# Patient Record
Sex: FEMALE | Race: OTHER | Employment: UNEMPLOYED | URBAN - METROPOLITAN AREA
[De-identification: names, ages, dates, MRNs, and addresses within clinical notes are randomized per-mention and may not be internally consistent; named-entity substitution may affect disease eponyms.]

---

## 2020-02-05 ENCOUNTER — OFFICE VISIT (OUTPATIENT)
Dept: FAMILY MEDICINE CLINIC | Facility: CLINIC | Age: 40
End: 2020-02-05
Payer: MEDICARE

## 2020-02-05 ENCOUNTER — LAB ENCOUNTER (OUTPATIENT)
Dept: LAB | Age: 40
End: 2020-02-05
Attending: FAMILY MEDICINE
Payer: MEDICARE

## 2020-02-05 VITALS
HEART RATE: 94 BPM | RESPIRATION RATE: 16 BRPM | WEIGHT: 165 LBS | SYSTOLIC BLOOD PRESSURE: 120 MMHG | OXYGEN SATURATION: 95 % | TEMPERATURE: 98 F | HEIGHT: 62 IN | DIASTOLIC BLOOD PRESSURE: 80 MMHG | BODY MASS INDEX: 30.36 KG/M2

## 2020-02-05 DIAGNOSIS — Q93.88 SMITH-MAGENIS SYNDROME: Primary | ICD-10-CM

## 2020-02-05 DIAGNOSIS — Z79.4 TYPE 2 DIABETES MELLITUS WITH DIABETIC NEUROPATHY, WITH LONG-TERM CURRENT USE OF INSULIN (HCC): ICD-10-CM

## 2020-02-05 DIAGNOSIS — M79.604 BILATERAL LEG PAIN: ICD-10-CM

## 2020-02-05 DIAGNOSIS — E11.40 TYPE 2 DIABETES MELLITUS WITH DIABETIC NEUROPATHY, WITH LONG-TERM CURRENT USE OF INSULIN (HCC): ICD-10-CM

## 2020-02-05 DIAGNOSIS — E78.5 HYPERLIPIDEMIA, UNSPECIFIED HYPERLIPIDEMIA TYPE: ICD-10-CM

## 2020-02-05 DIAGNOSIS — L40.9 PSORIASIS: ICD-10-CM

## 2020-02-05 DIAGNOSIS — H92.02 LEFT EAR PAIN: ICD-10-CM

## 2020-02-05 DIAGNOSIS — Z12.31 ENCOUNTER FOR SCREENING MAMMOGRAM FOR BREAST CANCER: ICD-10-CM

## 2020-02-05 DIAGNOSIS — M79.605 BILATERAL LEG PAIN: ICD-10-CM

## 2020-02-05 PROBLEM — I10 ESSENTIAL HYPERTENSION: Status: ACTIVE | Noted: 2020-02-05

## 2020-02-05 PROBLEM — Q93.59: Status: ACTIVE | Noted: 2020-02-05

## 2020-02-05 LAB
ALBUMIN SERPL-MCNC: 3.3 G/DL (ref 3.4–5)
ALBUMIN/GLOB SERPL: 0.8 {RATIO} (ref 1–2)
ALP LIVER SERPL-CCNC: 68 U/L (ref 37–98)
ALT SERPL-CCNC: 21 U/L (ref 13–56)
ANION GAP SERPL CALC-SCNC: 8 MMOL/L (ref 0–18)
AST SERPL-CCNC: 11 U/L (ref 15–37)
BASOPHILS # BLD AUTO: 0.04 X10(3) UL (ref 0–0.2)
BASOPHILS NFR BLD AUTO: 0.4 %
BILIRUB SERPL-MCNC: 0.2 MG/DL (ref 0.1–2)
BUN BLD-MCNC: 15 MG/DL (ref 7–18)
BUN/CREAT SERPL: 14.2 (ref 10–20)
CALCIUM BLD-MCNC: 8.7 MG/DL (ref 8.5–10.1)
CHLORIDE SERPL-SCNC: 104 MMOL/L (ref 98–112)
CHOLEST SMN-MCNC: 142 MG/DL (ref ?–200)
CO2 SERPL-SCNC: 22 MMOL/L (ref 21–32)
CREAT BLD-MCNC: 1.06 MG/DL (ref 0.55–1.02)
DEPRECATED RDW RBC AUTO: 43.2 FL (ref 35.1–46.3)
EOSINOPHIL # BLD AUTO: 0.45 X10(3) UL (ref 0–0.7)
EOSINOPHIL NFR BLD AUTO: 4.6 %
ERYTHROCYTE [DISTWIDTH] IN BLOOD BY AUTOMATED COUNT: 13.3 % (ref 11–15)
EST. AVERAGE GLUCOSE BLD GHB EST-MCNC: 171 MG/DL (ref 68–126)
GLOBULIN PLAS-MCNC: 4 G/DL (ref 2.8–4.4)
GLUCOSE BLD-MCNC: 144 MG/DL (ref 70–99)
HBA1C MFR BLD HPLC: 7.6 % (ref ?–5.7)
HCT VFR BLD AUTO: 34.2 % (ref 35–48)
HDLC SERPL-MCNC: 44 MG/DL (ref 40–59)
HGB BLD-MCNC: 11.1 G/DL (ref 12–16)
IMM GRANULOCYTES # BLD AUTO: 0.04 X10(3) UL (ref 0–1)
IMM GRANULOCYTES NFR BLD: 0.4 %
LDLC SERPL CALC-MCNC: 37 MG/DL (ref ?–100)
LYMPHOCYTES # BLD AUTO: 2.79 X10(3) UL (ref 1–4)
LYMPHOCYTES NFR BLD AUTO: 28.5 %
M PROTEIN MFR SERPL ELPH: 7.3 G/DL (ref 6.4–8.2)
MCH RBC QN AUTO: 28.7 PG (ref 26–34)
MCHC RBC AUTO-ENTMCNC: 32.5 G/DL (ref 31–37)
MCV RBC AUTO: 88.4 FL (ref 80–100)
MONOCYTES # BLD AUTO: 0.64 X10(3) UL (ref 0.1–1)
MONOCYTES NFR BLD AUTO: 6.5 %
NEUTROPHILS # BLD AUTO: 5.82 X10 (3) UL (ref 1.5–7.7)
NEUTROPHILS # BLD AUTO: 5.82 X10(3) UL (ref 1.5–7.7)
NEUTROPHILS NFR BLD AUTO: 59.6 %
NONHDLC SERPL-MCNC: 98 MG/DL (ref ?–130)
OSMOLALITY SERPL CALC.SUM OF ELEC: 281 MOSM/KG (ref 275–295)
PATIENT FASTING Y/N/NP: YES
PATIENT FASTING Y/N/NP: YES
PLATELET # BLD AUTO: 288 10(3)UL (ref 150–450)
POTASSIUM SERPL-SCNC: 4.2 MMOL/L (ref 3.5–5.1)
RBC # BLD AUTO: 3.87 X10(6)UL (ref 3.8–5.3)
SODIUM SERPL-SCNC: 134 MMOL/L (ref 136–145)
TRIGL SERPL-MCNC: 304 MG/DL (ref 30–149)
TSI SER-ACNC: 1.38 MIU/ML (ref 0.36–3.74)
VLDLC SERPL CALC-MCNC: 61 MG/DL (ref 0–30)
WBC # BLD AUTO: 9.8 X10(3) UL (ref 4–11)

## 2020-02-05 PROCEDURE — 80053 COMPREHEN METABOLIC PANEL: CPT

## 2020-02-05 PROCEDURE — 36415 COLL VENOUS BLD VENIPUNCTURE: CPT

## 2020-02-05 PROCEDURE — 83036 HEMOGLOBIN GLYCOSYLATED A1C: CPT

## 2020-02-05 PROCEDURE — 84443 ASSAY THYROID STIM HORMONE: CPT

## 2020-02-05 PROCEDURE — 99203 OFFICE O/P NEW LOW 30 MIN: CPT | Performed by: FAMILY MEDICINE

## 2020-02-05 PROCEDURE — 85025 COMPLETE CBC W/AUTO DIFF WBC: CPT

## 2020-02-05 PROCEDURE — 80061 LIPID PANEL: CPT

## 2020-02-05 RX ORDER — NORETHINDRONE AND ETHINYL ESTRADIOL 1 MG-35MCG
KIT ORAL
COMMUNITY
Start: 2020-01-21 | End: 2020-06-09

## 2020-02-05 RX ORDER — GABAPENTIN 300 MG/1
CAPSULE ORAL
COMMUNITY
Start: 2020-01-23 | End: 2020-03-05

## 2020-02-05 RX ORDER — FLASH GLUCOSE SENSOR
KIT MISCELLANEOUS
COMMUNITY

## 2020-02-05 RX ORDER — FLASH GLUCOSE SCANNING READER
EACH MISCELLANEOUS
COMMUNITY

## 2020-02-05 RX ORDER — TRAMADOL HYDROCHLORIDE 50 MG/1
50 TABLET ORAL 2 TIMES DAILY
Qty: 60 TABLET | Refills: 5 | Status: SHIPPED | OUTPATIENT
Start: 2020-02-05 | End: 2020-03-09

## 2020-02-05 RX ORDER — INSULIN GLARGINE 100 [IU]/ML
INJECTION, SOLUTION SUBCUTANEOUS
COMMUNITY
Start: 2019-12-22 | End: 2021-02-18

## 2020-02-05 RX ORDER — LOTEPREDNOL ETABONATE AND TOBRAMYCIN 5; 3 MG/ML; MG/ML
SUSPENSION/ DROPS OPHTHALMIC
COMMUNITY
Start: 2019-12-22 | End: 2020-10-27

## 2020-02-05 RX ORDER — LISINOPRIL 5 MG/1
5 TABLET ORAL 2 TIMES DAILY
COMMUNITY
End: 2020-03-03

## 2020-02-05 RX ORDER — SECUKINUMAB 150 MG/ML
INJECTION SUBCUTANEOUS
COMMUNITY
Start: 2020-01-31 | End: 2020-03-09

## 2020-02-05 RX ORDER — SIMVASTATIN 20 MG
TABLET ORAL
COMMUNITY
Start: 2020-01-28 | End: 2020-06-29

## 2020-02-05 RX ORDER — IBUPROFEN 800 MG/1
TABLET ORAL
COMMUNITY
Start: 2020-01-22 | End: 2020-03-16

## 2020-02-05 NOTE — H&P
[address]    History and Physical    Leiter Dayna Patient Status:  No patient class for patient encounter    1980 MRN [id number]   Location [address] [place] [address] Attending No att.  pro Past Medical History:   Diagnosis Date   • Benign tumor of ear canal, left    • Diabetes ([address]    • Hyperlipidemia    • Neuropathy    • Psoriasis    • Sleep disorder    • Smith-Magenis syndrome      Past Surgical History:   Procedure Laterality Date rhythm and normal heart sounds. No murmur heard. Edema not present. Pulmonary/Chest: Effort normal and breath sounds normal. No respiratory distress. She has no wheezes. She has no rales. Abdominal: Soft.  Bowel sounds are normal. She exhibits no di Ruddy–Eric-marissa but I wonder if some of her current conditions and symptoms and complaints are linked to this. I will need to do my research and read on this condition.     Follow-up in 4 weeks or as needed    Edna Mary MD  2/5/2020

## 2020-02-17 ENCOUNTER — TELEPHONE (OUTPATIENT)
Dept: FAMILY MEDICINE CLINIC | Facility: CLINIC | Age: 40
End: 2020-02-17

## 2020-02-17 DIAGNOSIS — E11.40 TYPE 2 DIABETES MELLITUS WITH DIABETIC NEUROPATHY, WITH LONG-TERM CURRENT USE OF INSULIN (HCC): Primary | ICD-10-CM

## 2020-02-17 DIAGNOSIS — I73.9 PAD (PERIPHERAL ARTERY DISEASE) (HCC): ICD-10-CM

## 2020-02-17 DIAGNOSIS — Z79.4 TYPE 2 DIABETES MELLITUS WITH DIABETIC NEUROPATHY, WITH LONG-TERM CURRENT USE OF INSULIN (HCC): Primary | ICD-10-CM

## 2020-02-17 NOTE — TELEPHONE ENCOUNTER
Spoke with Liane Lies in scheduling, states that bilateral leg pain does not pass medicare for US ankle brachial index. Can we use a different dx code? E11.40 may work with diabetic neuropathy. Or PAD?

## 2020-02-17 NOTE — TELEPHONE ENCOUNTER
Radiology dept calling, diagnosis codes do not pass medicare for US ankle brachial index. Left message for Sabrina Gan to discuss.

## 2020-02-20 ENCOUNTER — PATIENT MESSAGE (OUTPATIENT)
Dept: FAMILY MEDICINE CLINIC | Facility: CLINIC | Age: 40
End: 2020-02-20

## 2020-02-20 RX ORDER — GABAPENTIN 300 MG/1
300 CAPSULE ORAL 3 TIMES DAILY
Qty: 90 CAPSULE | Refills: 2 | Status: SHIPPED | OUTPATIENT
Start: 2020-02-20 | End: 2020-05-14

## 2020-02-20 NOTE — TELEPHONE ENCOUNTER
From: Angie Perales  To: Pablo Cardona MD  Sent: 2/20/2020 10:16 AM CST  Subject: Prescription Question    Alisha Snider the next prescription she has coming up will be the gabapentin

## 2020-02-20 NOTE — TELEPHONE ENCOUNTER
Requesting Gabapentin 300mg tid  LOV: 2/5/2020  RTC: 4 weeks  Last Relevant Labs: 2/5/2020  Filled: Last filled by old PCP    Future Appointments   Date Time Provider Aniyah Conklin   3/2/2020 10:30 AM Sanaz Barajas MD [facility] [address] [facility] [address]   3/3/2020

## 2020-03-02 ENCOUNTER — PATIENT MESSAGE (OUTPATIENT)
Dept: FAMILY MEDICINE CLINIC | Facility: CLINIC | Age: 40
End: 2020-03-02

## 2020-03-03 ENCOUNTER — HOSPITAL ENCOUNTER (OUTPATIENT)
Dept: ULTRASOUND IMAGING | Age: 40
Discharge: HOME | End: 2020-03-03
Attending: FAMILY MEDICINE
Payer: MEDICARE

## 2020-03-03 DIAGNOSIS — I73.9 PAD (PERIPHERAL ARTERY DISEASE) (HCC): ICD-10-CM

## 2020-03-03 PROCEDURE — 93922 UPR/L XTREMITY ART 2 LEVELS: CPT | Performed by: FAMILY MEDICINE

## 2020-03-03 RX ORDER — LISINOPRIL 5 MG/1
5 TABLET ORAL 2 TIMES DAILY
Qty: 180 TABLET | Refills: 1 | Status: CANCELLED | OUTPATIENT
Start: 2020-03-03

## 2020-03-03 RX ORDER — LISINOPRIL 5 MG/1
5 TABLET ORAL 2 TIMES DAILY
Qty: 180 TABLET | Refills: 1 | Status: SHIPPED | OUTPATIENT
Start: 2020-03-03 | End: 2020-05-26

## 2020-03-03 NOTE — TELEPHONE ENCOUNTER
From: Vernice Blizzard  To: Gina Chao MD  Sent: 3/2/2020 1:48 PM CST  Subject: Other    Also Rachelle Samaniego uses a sensor but she knocked one off and now has to wait a couple of weeks she is also out of the strips for that machine that it is a special machine.  Is

## 2020-03-03 NOTE — TELEPHONE ENCOUNTER
From: Kye Vargas  To: Maninder Mary MD  Sent: 3/2/2020 1:43 PM CST  Subject: Prescription Question    Hi I am in need of Linospril And nova log possible to have them sent over to [place]?  Thank you for your time

## 2020-03-03 NOTE — TELEPHONE ENCOUNTER
Requesting Insulin Aspart Pen 100 UNIT/ML Subcutaneous Solution Pen-injector   LOV: 2/5/20  RTC: 1 month  Last Relevant Labs: 2/5/20  Filled: 2/5/20 #10 pen with 11 refills    Future Appointments   Date Time Provider Aniyah Conklin   3/3/2020 10:45 AM P

## 2020-03-05 RX ORDER — TRAMADOL HYDROCHLORIDE 50 MG/1
50 TABLET ORAL 2 TIMES DAILY
Qty: 60 TABLET | Refills: 5 | OUTPATIENT
Start: 2020-03-05

## 2020-03-05 NOTE — TELEPHONE ENCOUNTER
Requesting Tramadol 50mg  LOV: 2/5/2020  RTC: 4 weeks  Last Relevant Labs: 2/5/2020  Filled: 2/5/2020 #60 with 5 refills    Future Appointments   Date Time Provider Aniyah Conklin   3/5/2020  2:30 PM Ulises Mayo MD [facility]   3/9/2020 10

## 2020-03-09 ENCOUNTER — OFFICE VISIT (OUTPATIENT)
Dept: FAMILY MEDICINE CLINIC | Facility: CLINIC | Age: 40
End: 2020-03-09
Payer: MEDICARE

## 2020-03-09 VITALS
HEIGHT: 62 IN | HEART RATE: 88 BPM | DIASTOLIC BLOOD PRESSURE: 86 MMHG | BODY MASS INDEX: 30.91 KG/M2 | RESPIRATION RATE: 16 BRPM | TEMPERATURE: 99 F | WEIGHT: 168 LBS | SYSTOLIC BLOOD PRESSURE: 124 MMHG

## 2020-03-09 DIAGNOSIS — E11.40 TYPE 2 DIABETES MELLITUS WITH DIABETIC NEUROPATHY, WITH LONG-TERM CURRENT USE OF INSULIN (HCC): Primary | ICD-10-CM

## 2020-03-09 DIAGNOSIS — M79.605 BILATERAL LEG PAIN: ICD-10-CM

## 2020-03-09 DIAGNOSIS — M79.604 BILATERAL LEG PAIN: ICD-10-CM

## 2020-03-09 DIAGNOSIS — Z79.4 TYPE 2 DIABETES MELLITUS WITH DIABETIC NEUROPATHY, WITH LONG-TERM CURRENT USE OF INSULIN (HCC): Primary | ICD-10-CM

## 2020-03-09 DIAGNOSIS — L40.9 PSORIASIS: ICD-10-CM

## 2020-03-09 PROCEDURE — 86580 TB INTRADERMAL TEST: CPT | Performed by: FAMILY MEDICINE

## 2020-03-09 PROCEDURE — 99214 OFFICE O/P EST MOD 30 MIN: CPT | Performed by: FAMILY MEDICINE

## 2020-03-09 RX ORDER — UREA 20 %
CREAM (GRAM) TOPICAL
COMMUNITY
Start: 2020-03-05

## 2020-03-09 RX ORDER — TRAMADOL HYDROCHLORIDE 50 MG/1
100 TABLET ORAL 2 TIMES DAILY
Qty: 120 TABLET | Refills: 5 | Status: SHIPPED | OUTPATIENT
Start: 2020-03-09 | End: 2020-06-30

## 2020-03-09 NOTE — PATIENT INSTRUCTIONS
Thank you for choosing Dilip Viramontes MD at [place] [address]  To Do: Warner Upton  1. Please take meds as directed. [facility] is located in [address]. Monday, Tuesday & Friday – 8 a.m. to 4 p.m. Wednesday, Thursday – 7 a.m. to 3 p.m. those potential risks and we strive to make you healthier and to improve your quality of life.     Referrals, and Radiology Information:    If your insurance requires a referral to a specialist, please allow 5 business days to process your referral request.

## 2020-03-09 NOTE — PROGRESS NOTES
HPI:    Patient ID: Alec Garcia is a 35year old female. HPI    Ms. 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) Does not apply Misc by Does not apply route. Test glucose four times daily     • Continuous Blood Gluc  ([employer] KASSANDRA 14 DAY READER) Does not apply Device by Does not apply route.  Test sugars four times daily     • UREA 20 I Referrals:  None       IC#[id number]

## 2020-03-11 ENCOUNTER — NURSE ONLY (OUTPATIENT)
Dept: FAMILY MEDICINE CLINIC | Facility: CLINIC | Age: 40
End: 2020-03-11
Payer: MEDICARE

## 2020-03-11 DIAGNOSIS — Z11.1 TUBERCULOSIS SCREENING: Primary | ICD-10-CM

## 2020-03-11 LAB — INDURATION (): 0 MM (ref 0–11)

## 2020-03-16 ENCOUNTER — PATIENT MESSAGE (OUTPATIENT)
Dept: FAMILY MEDICINE CLINIC | Facility: CLINIC | Age: 40
End: 2020-03-16

## 2020-03-16 RX ORDER — IBUPROFEN 800 MG/1
TABLET ORAL
Qty: 90 TABLET | Refills: 5 | Status: SHIPPED | OUTPATIENT
Start: 2020-03-16 | End: 2020-05-26

## 2020-03-16 NOTE — TELEPHONE ENCOUNTER
From: Karena Griffith  To: Jesse Cuevas MD  Sent: 3/16/2020 12:25 PM CDT  Subject: Prescription Question    Need ibuprofen refilled please

## 2020-03-27 ENCOUNTER — PATIENT MESSAGE (OUTPATIENT)
Dept: FAMILY MEDICINE CLINIC | Facility: CLINIC | Age: 40
End: 2020-03-27

## 2020-03-27 NOTE — TELEPHONE ENCOUNTER
From: Jasmeet Katz  To: Lisa Wesley MD  Sent: 3/27/2020 8:59 AM CDT  Subject: Other    Amanda Monge I'm texting you because Dave Artist needs her Zylet eyedrops refilled and it goes to [facility] but it's a new doctor which is you so in order to make it

## 2020-03-30 RX ORDER — TRAMADOL HYDROCHLORIDE 50 MG/1
100 TABLET ORAL 2 TIMES DAILY
Qty: 120 TABLET | Refills: 5 | OUTPATIENT
Start: 2020-03-30

## 2020-03-30 NOTE — TELEPHONE ENCOUNTER
Insulin Aspart Pen 100 UNIT/ML Subcutaneous Solution Pen-injector               Sig: Patient on sliding scale based on glucose number four times daily    Disp:  10 pen    Refills:  11    Start: 3/29/2020    Class: Normal    Non-formulary    Last ordered: 3

## 2020-05-08 ENCOUNTER — E-VISIT (OUTPATIENT)
Dept: FAMILY MEDICINE CLINIC | Facility: CLINIC | Age: 40
End: 2020-05-08

## 2020-05-08 DIAGNOSIS — Z02.9 ENCOUNTERS FOR ADMINISTRATIVE PURPOSE: Primary | ICD-10-CM

## 2020-05-08 NOTE — PROGRESS NOTES
Porsche Kelley is a 35year old female. HPI:   See answers to questions above.      Current Outpatient Medications   Medication Sig Dispense Refill   • Insulin Aspart Pen 100 UNIT/ML Subcutaneous Solution Pen-injector Patient on sliding scale based on gluco IMPLANT EAR TUBES     • OTHER Left     left arm fracture    • SHOULDER ARTHROSCOPY Right       Family History   Problem Relation Age of Onset   • No Known Problems Father    • Crohn's Disease Mother       Social History:  Social History    Tobacco Use

## 2020-05-14 RX ORDER — GABAPENTIN 300 MG/1
CAPSULE ORAL
Qty: 90 CAPSULE | Refills: 2 | Status: SHIPPED | OUTPATIENT
Start: 2020-05-14 | End: 2020-08-12

## 2020-05-14 NOTE — TELEPHONE ENCOUNTER
Requesting GABAPENTIN 300 MG    LOV: 3/9/20  RTC: 3 months  Last Relevant Labs: 2/5/20  Filled: 2/20/20 # 90with 2 refills    Future Appointments   Date Time Provider Aniyah Conklin   6/1/2020  2:30 PM Josias Velazquez [facility]re   6/2/2020  8

## 2020-05-23 ENCOUNTER — PATIENT MESSAGE (OUTPATIENT)
Dept: FAMILY MEDICINE CLINIC | Facility: CLINIC | Age: 40
End: 2020-05-23

## 2020-05-23 RX ORDER — LISINOPRIL 5 MG/1
5 TABLET ORAL 2 TIMES DAILY
Qty: 180 TABLET | Refills: 1 | Status: CANCELLED | OUTPATIENT
Start: 2020-05-23

## 2020-05-26 RX ORDER — IBUPROFEN 800 MG/1
TABLET ORAL
Qty: 90 TABLET | Refills: 5 | Status: SHIPPED | OUTPATIENT
Start: 2020-05-26 | End: 2020-11-12

## 2020-05-26 RX ORDER — LISINOPRIL 5 MG/1
5 TABLET ORAL 2 TIMES DAILY
Qty: 180 TABLET | Refills: 1 | Status: SHIPPED | OUTPATIENT
Start: 2020-05-26 | End: 2020-08-29

## 2020-05-26 RX ORDER — LISINOPRIL 5 MG/1
5 TABLET ORAL 2 TIMES DAILY
Qty: 180 TABLET | Refills: 1 | Status: CANCELLED | OUTPATIENT
Start: 2020-05-26

## 2020-05-26 RX ORDER — IBUPROFEN 800 MG/1
TABLET ORAL
Qty: 90 TABLET | Refills: 5 | Status: CANCELLED | OUTPATIENT
Start: 2020-05-26

## 2020-05-26 NOTE — TELEPHONE ENCOUNTER
Diabetic Medication Protocol Failed5/26 10:37 AM   Last HgBA1C < 7.5    HgBA1C procedure resulted in past 6 months    Microalbumin procedure in past 12 months or taking ACE/ARB    Appointment in past 6 or next 3 months     Requesting : METFORMIN HCL 1000

## 2020-05-26 NOTE — TELEPHONE ENCOUNTER
From: Kye Vargas  To: Maninder Mary MD  Sent: 5/23/2020 6:54 AM CDT  Subject: Prescription Question    Hi Dr. Sarah Horner I just need to have met Kailash mejia thank you

## 2020-05-31 ENCOUNTER — PATIENT MESSAGE (OUTPATIENT)
Dept: FAMILY MEDICINE CLINIC | Facility: CLINIC | Age: 40
End: 2020-05-31

## 2020-06-01 ENCOUNTER — TELEMEDICINE (OUTPATIENT)
Dept: RHEUMATOLOGY | Facility: CLINIC | Age: 40
End: 2020-06-01

## 2020-06-01 VITALS — WEIGHT: 171 LBS | HEIGHT: 61.5 IN | BODY MASS INDEX: 31.87 KG/M2

## 2020-06-01 DIAGNOSIS — M19.90 INFLAMMATORY ARTHRITIS: Primary | ICD-10-CM

## 2020-06-01 DIAGNOSIS — M25.50 POLYARTHRALGIA: ICD-10-CM

## 2020-06-01 DIAGNOSIS — Z83.79 FAMILY HISTORY OF CROHN'S DISEASE: ICD-10-CM

## 2020-06-01 DIAGNOSIS — L40.9 PSORIASIS: ICD-10-CM

## 2020-06-01 DIAGNOSIS — Q93.88 SMITH-MAGENIS SYNDROME: ICD-10-CM

## 2020-06-01 PROCEDURE — 99204 OFFICE O/P NEW MOD 45 MIN: CPT | Performed by: INTERNAL MEDICINE

## 2020-06-01 NOTE — PROGRESS NOTES
[facility] TeleHealth Audio and Visual Visit   Office visit canceled due to coronavirus pandemic    This was an audio/video conversation using [employer] in lieu of an in-person visit due to need to limit person to person contact during the coronavirus arthritis  (primary encounter diagnosis)  Polyarthralgia  Psoriasis  Family history of Crohn's disease  Fox-Magenis syndrome    Discussion:  Ms. Mariam Mcintosh is a 37 yo woman with a complicated past medical history including Smith-Magenis syndrome ( CYCLIC CITRULLINATE PEP. IGG; Future  -     XR LUMBAR SPINE (MIN 4 VIEWS) (CPT=72110); Future  -     XR SACROILIAC JOINTS (MIN 3 VIEWS) (CPT=72202); Future  -     HEPATITIS PANEL, ACUTE (4); Future  -     XR HAND (MIN 3 VIEWS), RIGHT (CPT=73130);  Future for about 6 months, with little to no relief. Started Enbrel and after a few weeks, she started to develop cysts all over her body. Discontinued Enbrel and cysts resolved.  She was on Enbrel only for about one month- doing twice weekly injections for the Past Surgical History:  Past Surgical History:   Procedure Laterality Date   • ADENOIDECTOMY     • CREATE EARDRUM OPENING,GEN ANESTH     • HC IMPLANT EAR TUBES     • OTHER Left     left arm fracture    • SHOULDER ARTHROSCOPY Right      Family History: simvastatin 20 MG Oral Tab, TK 1 T PO QHS, Disp: , Rfl:   Continuous Blood Gluc Sensor (FREE[employer] KASSANDRA 14 DAY SENSOR) Does not apply Misc, by Does not apply route.  Test glucose four times daily, Disp: , Rfl:   Continuous Blood Gluc  (FREESTYLE L icterus. Neck: No JVD present. No tracheal deviation present. Pulmonary/Chest: Effort normal. No respiratory distress. Musculoskeletal:         General: Deformity and edema present.       Comments: Swelling ans appearance of dactylitis of right pinky Rheumatology  6/1/2020

## 2020-06-01 NOTE — TELEPHONE ENCOUNTER
From: Hannah Herrera  To: Hansa Grayson MD  Sent: 5/31/2020 6:24 PM CDT  Subject: Visit Fonda Saint Dr. Jose Luis Brush I hope all is well Lauri Chavez has an appointment on June 22 with you but is it possible to do a virtual appointment video appointmen

## 2020-06-02 RX ORDER — CELECOXIB 100 MG/1
CAPSULE ORAL
Qty: 180 CAPSULE | Refills: 0 | OUTPATIENT
Start: 2020-06-02

## 2020-06-02 RX ORDER — CELECOXIB 100 MG/1
100 CAPSULE ORAL 2 TIMES DAILY WITH MEALS
Qty: 60 CAPSULE | Refills: 0 | Status: SHIPPED | OUTPATIENT
Start: 2020-06-02 | End: 2020-06-29

## 2020-06-02 NOTE — PATIENT INSTRUCTIONS
-- get updated labs and imaging  -- call central scheduling to schedule those tests [phone]  -- follow up in 2-3 weeks for in office visit for full physical exam as well as discussing test results and next steps in management  -- try celebrex twice da

## 2020-06-09 ENCOUNTER — PATIENT MESSAGE (OUTPATIENT)
Dept: FAMILY MEDICINE CLINIC | Facility: CLINIC | Age: 40
End: 2020-06-09

## 2020-06-09 RX ORDER — NORETHINDRONE AND ETHINYL ESTRADIOL 1 MG-35MCG
1 KIT ORAL DAILY
Qty: 84 TABLET | Refills: 1 | Status: SHIPPED | OUTPATIENT
Start: 2020-06-09 | End: 2020-12-05

## 2020-06-09 NOTE — TELEPHONE ENCOUNTER
From: Marilin Beltrán  To: Roland Aguirre MD  Sent: 6/9/2020 2:07 PM CDT  Subject: Non-Urgent Louana Case Dr. Christen Omalley I hope all is well just writing to request that you OK a refill for Nortel I believe it is a birth control pill.  Terry nagel

## 2020-06-13 ENCOUNTER — HOSPITAL ENCOUNTER (OUTPATIENT)
Dept: CT IMAGING | Age: 40
Discharge: HOME | End: 2020-06-13
Attending: OTOLARYNGOLOGY
Payer: MEDICARE

## 2020-06-13 DIAGNOSIS — H90.A32 MIXED CONDUCTIVE AND SENSORINEURAL HEARING LOSS OF LEFT EAR WITH RESTRICTED HEARING OF RIGHT EAR: ICD-10-CM

## 2020-06-13 PROCEDURE — 70480 CT ORBIT/EAR/FOSSA W/O DYE: CPT | Performed by: OTOLARYNGOLOGY

## 2020-06-15 NOTE — PROGRESS NOTES
Your CT scan shows deeper inflammation of the left ear. We will discuss this further at your upcoming video visit. Please see below for instructions on how to connect to the video visit. Please let me know if you have any questions.     Sánchez Madison    Than

## 2020-06-22 ENCOUNTER — VIRTUAL PHONE E/M (OUTPATIENT)
Dept: FAMILY MEDICINE CLINIC | Facility: CLINIC | Age: 40
End: 2020-06-22
Payer: MEDICARE

## 2020-06-22 DIAGNOSIS — M79.604 BILATERAL LEG PAIN: Primary | ICD-10-CM

## 2020-06-22 DIAGNOSIS — M79.605 BILATERAL LEG PAIN: Primary | ICD-10-CM

## 2020-06-22 DIAGNOSIS — H92.02 LEFT EAR PAIN: ICD-10-CM

## 2020-06-22 DIAGNOSIS — L40.9 PSORIASIS: ICD-10-CM

## 2020-06-22 PROCEDURE — 99441 PHONE E/M BY PHYS 5-10 MIN: CPT | Performed by: FAMILY MEDICINE

## 2020-06-22 RX ORDER — TRAMADOL HYDROCHLORIDE 100 MG/1
1 TABLET, COATED ORAL 3 TIMES DAILY PRN
Qty: 90 TABLET | Refills: 5 | Status: SHIPPED | OUTPATIENT
Start: 2020-06-22 | End: 2020-10-27

## 2020-06-22 NOTE — PATIENT INSTRUCTIONS
Thank you for choosing Dave Blake MD at [place] [address]  To Do: Archie Fernandes  1. Please take meds as directed. Jesse Kris Archuleta is located in [address]. Monday, Tuesday & Friday – 8 a.m. to 4 p.m. Wednesday, Thursday – 7 a.m. to 3 p.m. those potential risks and we strive to make you healthier and to improve your quality of life.     Referrals, and Radiology Information:    If your insurance requires a referral to a specialist, please allow 5 business days to process your referral request.

## 2020-06-22 NOTE — PROGRESS NOTES
Virtual Telephone Check-In    Gonzalez Laird verbally consents to a Virtual/Telephone Check-In visit on 06/22/20. Patient has been referred to the [facility] website at [email or web address] to review the yearly Consent to Treat document.     Patient Bard Sample

## 2020-06-22 NOTE — PROGRESS NOTES
HPI:    Patient ID: Warner Upton is a 38year old female. HPI  Ms. Marlen Vazquez is a pleasant  37 y/o F with history of Smith-Magenis syndrome diagnosed when she was a baby (adopted), diabetes mellitus, insulin-dependent, hypertension, hyperlipidemia, psor times daily 10 pen 11   • UREA 20 INTENSIVE HYDRATING 20 % External Cream Use as directed     • traMADol HCl 50 MG Oral Tab Take 2 tablets (100 mg total) by mouth 2 (two) times daily.  120 tablet 5   • Glucose Blood (CONTOUR TEST) In Vitro Strip Test sugars

## 2020-06-29 ENCOUNTER — PATIENT MESSAGE (OUTPATIENT)
Dept: FAMILY MEDICINE CLINIC | Facility: CLINIC | Age: 40
End: 2020-06-29

## 2020-06-29 ENCOUNTER — OFFICE VISIT (OUTPATIENT)
Dept: RHEUMATOLOGY | Facility: CLINIC | Age: 40
End: 2020-06-29
Payer: MEDICARE

## 2020-06-29 VITALS
TEMPERATURE: 98 F | WEIGHT: 163 LBS | HEIGHT: 62 IN | HEART RATE: 78 BPM | SYSTOLIC BLOOD PRESSURE: 110 MMHG | RESPIRATION RATE: 18 BRPM | DIASTOLIC BLOOD PRESSURE: 78 MMHG | BODY MASS INDEX: 30 KG/M2

## 2020-06-29 DIAGNOSIS — M25.50 POLYARTHRALGIA: ICD-10-CM

## 2020-06-29 DIAGNOSIS — L40.9 PSORIASIS: ICD-10-CM

## 2020-06-29 DIAGNOSIS — Z83.79 FAMILY HISTORY OF CROHN'S DISEASE: ICD-10-CM

## 2020-06-29 DIAGNOSIS — L40.50 PSORIATIC ARTHRITIS (HCC): Primary | ICD-10-CM

## 2020-06-29 DIAGNOSIS — Q93.88 SMITH-MAGENIS SYNDROME: ICD-10-CM

## 2020-06-29 DIAGNOSIS — M19.90 INFLAMMATORY ARTHRITIS: ICD-10-CM

## 2020-06-29 PROCEDURE — 99214 OFFICE O/P EST MOD 30 MIN: CPT | Performed by: INTERNAL MEDICINE

## 2020-06-29 RX ORDER — SIMVASTATIN 20 MG
TABLET ORAL
Qty: 90 TABLET | Refills: 1 | Status: SHIPPED | OUTPATIENT
Start: 2020-06-29 | End: 2020-11-10

## 2020-06-29 RX ORDER — CELECOXIB 100 MG/1
CAPSULE ORAL
Qty: 180 CAPSULE | Refills: 0 | Status: SHIPPED | OUTPATIENT
Start: 2020-06-29 | End: 2020-09-28

## 2020-06-29 NOTE — TELEPHONE ENCOUNTER
Cholesterol Medication Protocol Passed6/29 9:51 AM   ALT < 80    ALT resulted within past year    Lipid panel within past 12 months    Appointment within past 12 or next 3 months     Refill protocol passed because the patient met the following protocol for

## 2020-06-29 NOTE — PROGRESS NOTES
RHEUMATOLOGY Follow up   Date of visit: 6/29/2020  ? Patient presents with: Follow - Up: est pt, fu 3wks. Pt states 'everything is bothering me'. Difficulty in describing pain and where she is experiencing it. Mother present.  Pt co bilateral hand, and hypersensitivity reactions may have been at times, including lung toxicities and she was educated on signs and symptoms such as fever, persistent cough, and shortness of breath. Due to potential lung toxicity with this medication, baseline CXR should be on visit, patient continues to have significant joint pain, however difficult for her to say specifically where the pain is. Seems like pain is along left side, however per patient's mother patient complains more of low back pain.   Does have some active skin underlying condition.      Denies morning pain but states by night time, her pain is severe. Takes tramadol 50mg twice daily. Ibuprofen throughout the day. Seems only thing that gives her relief is the tramadol.    Denies reflux symptoms   + wakes up at nig Disp: 180 capsule, Rfl: 0  SIMVASTATIN 20 MG Oral Tab, TAKE 1 TABLET BY MOUTH EVERY NIGHT AT BEDTIME, Disp: 90 tablet, Rfl: 1  traMADol HCl 100 MG Oral Tab, Take 1 tablet by mouth 3 (three) times daily as needed. , Disp: 90 tablet, Rfl: 5  ALYACEN 1/35 1-35 congestion, nosebleeds, sore throat and tinnitus. Eyes: Negative for blurred vision, double vision and photophobia. Respiratory: Negative for cough, shortness of breath and wheezing.     Cardiovascular: Negative for chest pain, claudication and leg swe nontender. Left elbow contracture. Lymphadenopathy:     She has no cervical adenopathy. Skin: Skin is warm and dry. She is not diaphoretic. No erythema.    Multiple raised skin lesions noted ?skin tags vs cysts  Toenail fungus noted  No obvious fingern

## 2020-06-29 NOTE — TELEPHONE ENCOUNTER
From: Peri Dong  To: Louis Betancourt MD  Sent: 6/29/2020 10:40 AM CDT  Subject: Non-Urgent Medical Question    Good morning Dr. Louise Noble just wanted to ask if you could refill Yumi's Simvastin Her script was from her previous doctor in [place] so [address] [place] [address]

## 2020-07-06 ENCOUNTER — PATIENT MESSAGE (OUTPATIENT)
Dept: FAMILY MEDICINE CLINIC | Facility: CLINIC | Age: 40
End: 2020-07-06

## 2020-07-06 RX ORDER — TRAMADOL HYDROCHLORIDE 50 MG/1
TABLET ORAL
Qty: 90 TABLET | Refills: 5 | Status: SHIPPED | OUTPATIENT
Start: 2020-07-06 | End: 2020-12-09

## 2020-07-06 NOTE — TELEPHONE ENCOUNTER
From: Hari Zamora  To: Sandhya Woodard MD  Sent: 7/6/2020 11:21 AM CDT  Subject: Non-Urgent Ayaanus Jamin Rodriguez I just received a text from [place] stating Yumi's tramadol was ready for  however it was for one tablet three chilango

## 2020-07-18 RX ORDER — TRAMADOL HYDROCHLORIDE 50 MG/1
TABLET ORAL
Qty: 90 TABLET | Refills: 5 | Status: CANCELLED | OUTPATIENT
Start: 2020-07-18

## 2020-07-20 RX ORDER — TRAMADOL HYDROCHLORIDE 50 MG/1
100 TABLET ORAL 3 TIMES DAILY
Qty: 180 TABLET | Refills: 0 | Status: SHIPPED | OUTPATIENT
Start: 2020-07-20 | End: 2020-08-17

## 2020-07-20 NOTE — TELEPHONE ENCOUNTER
Rx for Tramadol 50mg (2 tablets three times daily) was sent to pharmacy on 7/6/20 but only for #90. Pt needs new script sent to pharmacy for 1 month supply. Rx pended.

## 2020-08-12 RX ORDER — GABAPENTIN 300 MG/1
CAPSULE ORAL
Qty: 90 CAPSULE | Refills: 2 | Status: SHIPPED | OUTPATIENT
Start: 2020-08-12 | End: 2020-11-03

## 2020-08-12 NOTE — TELEPHONE ENCOUNTER
Requesting GABAPENTIN 300 MG   LOV: 6/22/20 (virtual)  RTC:   Last Relevant Labs:2/5/20  Filled: 5/14/20 # 80  with 2 refills    Future Appointments   Date Time Provider Aniyah Conklin   10/8/2020  3:45 PM Gracia Perdomo, DO [facility] [address]

## 2020-08-17 ENCOUNTER — PATIENT MESSAGE (OUTPATIENT)
Dept: FAMILY MEDICINE CLINIC | Facility: CLINIC | Age: 40
End: 2020-08-17

## 2020-08-17 RX ORDER — TRAMADOL HYDROCHLORIDE 50 MG/1
TABLET ORAL
Qty: 180 TABLET | Refills: 0 | Status: SHIPPED | OUTPATIENT
Start: 2020-08-17 | End: 2020-10-01

## 2020-08-17 NOTE — TELEPHONE ENCOUNTER
Requested Prescriptions     Pending Prescriptions Disp Refills   • TRAMADOL HCL 50 MG Oral Tab [Pharmacy Med Name: TRAMADOL 50MG TABLETS] 180 tablet 0     Sig: TAKE 2 TABLETS(100 MG) BY MOUTH THREE TIMES DAILY        LOV: 6/22/20 Virtual Phone Visit  RTC:

## 2020-08-17 NOTE — TELEPHONE ENCOUNTER
From: Gonzalez Laird  To: Sivan Osullivan MD  Sent: 8/17/2020 10:20 AM CDT  Subject: Non-Urgent Medical Question    So you can disregard the request on Yumi's tramadol medication From [facility].  You can disregard it they made an error and put it under a new

## 2020-08-18 ENCOUNTER — PATIENT MESSAGE (OUTPATIENT)
Dept: FAMILY MEDICINE CLINIC | Facility: CLINIC | Age: 40
End: 2020-08-18

## 2020-08-18 RX ORDER — TRAMADOL HYDROCHLORIDE 50 MG/1
50 TABLET ORAL 3 TIMES DAILY PRN
Qty: 90 TABLET | Refills: 1 | Status: SHIPPED | OUTPATIENT
Start: 2020-08-18 | End: 2020-10-17

## 2020-08-18 NOTE — TELEPHONE ENCOUNTER
From: Ash Askew  To: Bettie Peralta MD  Sent: 8/18/2020 12:23 PM CDT  Subject: Prescription Question    So thank you for taking care of the tramadol issue.  However this prescription is for two tramadol three times a day and she takes it three times a

## 2020-08-31 RX ORDER — LISINOPRIL 5 MG/1
5 TABLET ORAL 2 TIMES DAILY
Qty: 180 TABLET | Refills: 1 | Status: SHIPPED | OUTPATIENT
Start: 2020-08-31 | End: 2021-04-23

## 2020-08-31 NOTE — TELEPHONE ENCOUNTER
Hypertension Medications Protocol Passed8/29 9:22 AM   CMP or BMP in past 12 months    Last serum creatinine< 2.0    Appointment in past 6 or next 3 months     Refill protocol passed because the patient met the following protocol for    Requested Prescript

## 2020-09-15 ENCOUNTER — LAB ENCOUNTER (OUTPATIENT)
Dept: LAB | Facility: HOSPITAL | Age: 40
End: 2020-09-15
Attending: INTERNAL MEDICINE
Payer: MEDICARE

## 2020-09-15 DIAGNOSIS — M19.90 INFLAMMATORY ARTHRITIS: ICD-10-CM

## 2020-09-15 DIAGNOSIS — M25.50 POLYARTHRALGIA: ICD-10-CM

## 2020-09-15 LAB
CRP SERPL-MCNC: 0.48 MG/DL (ref ?–0.3)
HAV IGM SER QL: NONREACTIVE
HBV CORE IGM SER QL: NONREACTIVE
HBV SURFACE AG SERPL QL IA: NONREACTIVE
HCV AB SERPL QL IA: NONREACTIVE
RHEUMATOID FACT SERPL-ACNC: <10 IU/ML (ref ?–15)
SED RATE-ML: 20 MM/HR (ref 0–25)

## 2020-09-15 PROCEDURE — 86812 HLA TYPING A B OR C: CPT

## 2020-09-15 PROCEDURE — 36415 COLL VENOUS BLD VENIPUNCTURE: CPT

## 2020-09-15 PROCEDURE — 86200 CCP ANTIBODY: CPT

## 2020-09-15 PROCEDURE — 80074 ACUTE HEPATITIS PANEL: CPT

## 2020-09-15 PROCEDURE — 86140 C-REACTIVE PROTEIN: CPT

## 2020-09-15 PROCEDURE — 85652 RBC SED RATE AUTOMATED: CPT

## 2020-09-15 PROCEDURE — 86431 RHEUMATOID FACTOR QUANT: CPT

## 2020-09-16 ENCOUNTER — PATIENT MESSAGE (OUTPATIENT)
Dept: RHEUMATOLOGY | Facility: CLINIC | Age: 40
End: 2020-09-16

## 2020-09-17 LAB — HLA-B27: NEGATIVE

## 2020-09-18 LAB — CCP IGG SERPL-ACNC: 1.1 U/ML (ref 0–6.9)

## 2020-09-21 ENCOUNTER — TELEPHONE (OUTPATIENT)
Dept: RHEUMATOLOGY | Facility: CLINIC | Age: 40
End: 2020-09-21

## 2020-09-21 DIAGNOSIS — D64.9 ANEMIA, UNSPECIFIED TYPE: ICD-10-CM

## 2020-09-21 DIAGNOSIS — M25.50 POLYARTHRALGIA: ICD-10-CM

## 2020-09-21 DIAGNOSIS — Z79.899 HIGH RISK MEDICATION USE: ICD-10-CM

## 2020-09-21 DIAGNOSIS — L40.9 PSORIASIS: ICD-10-CM

## 2020-09-21 DIAGNOSIS — L40.50 PSORIATIC ARTHRITIS (HCC): Primary | ICD-10-CM

## 2020-09-21 RX ORDER — METHOTREXATE 2.5 MG/1
15 TABLET ORAL WEEKLY
Qty: 24 TABLET | Refills: 2 | Status: SHIPPED | OUTPATIENT
Start: 2020-09-21 | End: 2020-10-27 | Stop reason: ALTCHOICE

## 2020-09-21 RX ORDER — FOLIC ACID 1 MG/1
1 TABLET ORAL DAILY
Qty: 90 TABLET | Refills: 0 | Status: SHIPPED | OUTPATIENT
Start: 2020-09-21 | End: 2021-04-05

## 2020-09-21 NOTE — TELEPHONE ENCOUNTER
----- Message from Rolando Delgado DO sent at 9/21/2020  8:12 AM CDT -----  Regarding: FW: Non-Urgent Medical Question  Contact: [phone]  Please call pt's mother and clarify. There are a lot of mistypes and difficult to understand this [employer] message. can

## 2020-09-21 NOTE — TELEPHONE ENCOUNTER
Mother Daren Munson phoned office, clarified with mother that she was the person who sent the patients [employer] message. Has received her daughtesr lab results via [employer]. Has questions regarding daughters labs and her condition.  Would like call back from Dr. Daryl Cortes

## 2020-09-21 NOTE — TELEPHONE ENCOUNTER
Returned patient's mother's phone call. Discussed test results in detail again  . Patient does have upcoming appointment in about 2 weeks however patient's mother would like to start treatment.   Encouraged her to get not only the hand x-ray but the low b on methotrexate, sulfa based drugs including but not limited to Bactrim, should be avoided due to potential toxicity. Patient's mother verbalized understanding of above instructions. No further questions at this time.     Gracia Perdomo,   [facility]

## 2020-09-22 ENCOUNTER — HOSPITAL ENCOUNTER (OUTPATIENT)
Dept: GENERAL RADIOLOGY | Facility: HOSPITAL | Age: 40
Discharge: HOME | End: 2020-09-22
Attending: INTERNAL MEDICINE
Payer: MEDICARE

## 2020-09-22 ENCOUNTER — LAB ENCOUNTER (OUTPATIENT)
Dept: LAB | Facility: HOSPITAL | Age: 40
End: 2020-09-22
Attending: INTERNAL MEDICINE
Payer: MEDICARE

## 2020-09-22 DIAGNOSIS — M25.50 POLYARTHRALGIA: ICD-10-CM

## 2020-09-22 DIAGNOSIS — M19.90 INFLAMMATORY ARTHRITIS: ICD-10-CM

## 2020-09-22 DIAGNOSIS — D64.9 ANEMIA, UNSPECIFIED TYPE: ICD-10-CM

## 2020-09-22 DIAGNOSIS — L40.50 PSORIATIC ARTHRITIS (HCC): ICD-10-CM

## 2020-09-22 LAB
DEPRECATED HBV CORE AB SER IA-ACNC: 3.6 NG/ML
IRON SATURATION: 8 %
IRON SERPL-MCNC: 43 UG/DL
TOTAL IRON BINDING CAPACITY: 565 UG/DL (ref 240–450)
TRANSFERRIN SERPL-MCNC: 379 MG/DL (ref 200–360)

## 2020-09-22 PROCEDURE — 73130 X-RAY EXAM OF HAND: CPT | Performed by: INTERNAL MEDICINE

## 2020-09-22 PROCEDURE — 82728 ASSAY OF FERRITIN: CPT

## 2020-09-22 PROCEDURE — 83550 IRON BINDING TEST: CPT

## 2020-09-22 PROCEDURE — 36415 COLL VENOUS BLD VENIPUNCTURE: CPT

## 2020-09-22 PROCEDURE — 83540 ASSAY OF IRON: CPT

## 2020-09-22 PROCEDURE — 72202 X-RAY EXAM SI JOINTS 3/> VWS: CPT | Performed by: INTERNAL MEDICINE

## 2020-09-22 PROCEDURE — 72110 X-RAY EXAM L-2 SPINE 4/>VWS: CPT | Performed by: INTERNAL MEDICINE

## 2020-09-23 ENCOUNTER — PATIENT MESSAGE (OUTPATIENT)
Dept: FAMILY MEDICINE CLINIC | Facility: CLINIC | Age: 40
End: 2020-09-23

## 2020-09-23 RX ORDER — FERROUS SULFATE 325(65) MG
325 TABLET ORAL
Qty: 90 TABLET | Refills: 0 | Status: SHIPPED | OUTPATIENT
Start: 2020-09-23

## 2020-09-23 NOTE — TELEPHONE ENCOUNTER
From: Lucio Mock  To: Prabhjot Rojas MD  Sent: 9/23/2020 7:15 AM CDT  Subject: Non-Urgent Medical Question    Good morning Dr. Herlinda Shine I am texting you because of Yumi's iron test doctor told me to contact you and have you prescribe some type of iron

## 2020-09-23 NOTE — TELEPHONE ENCOUNTER
See MyChart message below:    Pt had labs done yesterday through Dr. Gene Holloway  Ref Range & Units 9/22/20  1:17 PM   Iron   50 - 170 ug/dL 43Low     Transferrin   200 - 360 mg/dL 379High     Total Iron Binding Capacity   240 - 450 ug/dL 565High     % Saturati

## 2020-09-28 ENCOUNTER — PATIENT MESSAGE (OUTPATIENT)
Dept: FAMILY MEDICINE CLINIC | Facility: CLINIC | Age: 40
End: 2020-09-28

## 2020-09-28 RX ORDER — CELECOXIB 100 MG/1
CAPSULE ORAL
Qty: 180 CAPSULE | Refills: 0 | Status: SHIPPED | OUTPATIENT
Start: 2020-09-28 | End: 2020-10-27 | Stop reason: ALTCHOICE

## 2020-09-29 RX ORDER — TRAMADOL HYDROCHLORIDE 50 MG/1
100 TABLET ORAL 3 TIMES DAILY
Qty: 180 TABLET | Refills: 0 | Status: CANCELLED | OUTPATIENT
Start: 2020-09-29

## 2020-09-29 NOTE — TELEPHONE ENCOUNTER
From: Lucio Mock  To: Prabhjot Rojas MD  Sent: 9/28/2020 10:50 AM CDT  Subject: Non-Urgent Medical Question    Please send new prescription for Yumi's trammadol currently taking two tablets three times daily.  Pharmacy number is [phone] this is a C

## 2020-09-30 ENCOUNTER — PATIENT MESSAGE (OUTPATIENT)
Dept: FAMILY MEDICINE CLINIC | Facility: CLINIC | Age: 40
End: 2020-09-30

## 2020-10-01 RX ORDER — TRAMADOL HYDROCHLORIDE 50 MG/1
100 TABLET ORAL 3 TIMES DAILY
Qty: 180 TABLET | Refills: 1 | Status: SHIPPED | OUTPATIENT
Start: 2020-10-01 | End: 2020-10-27 | Stop reason: ALTCHOICE

## 2020-10-01 NOTE — TELEPHONE ENCOUNTER
From: Marilin Beltrán  To: Roland Aguirre MD  Sent: 9/30/2020 2:00 PM CDT  Subject: Non-Urgent Medical Question    Please send Yumi's pain medication over thank you

## 2020-10-01 NOTE — TELEPHONE ENCOUNTER
Pt's mother called following up on tramadol refill request to the [facility] pharmacy. Apologized for the delay, informed mother that I will speak to Dr. Mony Guerra and get refill sent to preferred pharmacy.

## 2020-10-01 NOTE — TELEPHONE ENCOUNTER
From: Lily Christian  To: Filomena Lyeva MD  Sent: 9/30/2020 5:55 PM CDT  Subject: Non-Urgent Medical Question    So Dr. Shwetha Chan I sent you a message a couple days ago one for Yumi's pain medication and the other for information on A1c when it should be d

## 2020-10-12 ENCOUNTER — TELEPHONE (OUTPATIENT)
Dept: FAMILY MEDICINE CLINIC | Facility: CLINIC | Age: 40
End: 2020-10-12

## 2020-10-12 DIAGNOSIS — E11.40 TYPE 2 DIABETES MELLITUS WITH DIABETIC NEUROPATHY, WITH LONG-TERM CURRENT USE OF INSULIN (HCC): ICD-10-CM

## 2020-10-12 DIAGNOSIS — Z79.4 TYPE 2 DIABETES MELLITUS WITH DIABETIC NEUROPATHY, WITH LONG-TERM CURRENT USE OF INSULIN (HCC): ICD-10-CM

## 2020-10-12 DIAGNOSIS — E78.5 HYPERLIPIDEMIA, UNSPECIFIED HYPERLIPIDEMIA TYPE: ICD-10-CM

## 2020-10-12 DIAGNOSIS — Z00.00 LABORATORY EXAMINATION ORDERED AS PART OF A ROUTINE GENERAL MEDICAL EXAMINATION: Primary | ICD-10-CM

## 2020-10-13 NOTE — TELEPHONE ENCOUNTER
LMOM for patient's mother that labs have been ordered and to scheduled an appointment to have them completed. Once get them completed, call office to schedule an appt withphysician.

## 2020-10-26 ENCOUNTER — LAB ENCOUNTER (OUTPATIENT)
Dept: LAB | Age: 40
End: 2020-10-26
Attending: FAMILY MEDICINE
Payer: MEDICARE

## 2020-10-26 DIAGNOSIS — E11.40 TYPE 2 DIABETES MELLITUS WITH DIABETIC NEUROPATHY, WITH LONG-TERM CURRENT USE OF INSULIN (HCC): ICD-10-CM

## 2020-10-26 DIAGNOSIS — E78.5 HYPERLIPIDEMIA, UNSPECIFIED HYPERLIPIDEMIA TYPE: ICD-10-CM

## 2020-10-26 DIAGNOSIS — Z79.4 TYPE 2 DIABETES MELLITUS WITH DIABETIC NEUROPATHY, WITH LONG-TERM CURRENT USE OF INSULIN (HCC): ICD-10-CM

## 2020-10-26 DIAGNOSIS — Z00.00 LABORATORY EXAMINATION ORDERED AS PART OF A ROUTINE GENERAL MEDICAL EXAMINATION: ICD-10-CM

## 2020-10-26 PROCEDURE — 80061 LIPID PANEL: CPT

## 2020-10-26 PROCEDURE — 83036 HEMOGLOBIN GLYCOSYLATED A1C: CPT

## 2020-10-26 PROCEDURE — 80053 COMPREHEN METABOLIC PANEL: CPT

## 2020-10-26 PROCEDURE — 85025 COMPLETE CBC W/AUTO DIFF WBC: CPT

## 2020-10-26 PROCEDURE — 84443 ASSAY THYROID STIM HORMONE: CPT

## 2020-10-26 PROCEDURE — 36415 COLL VENOUS BLD VENIPUNCTURE: CPT

## 2020-10-27 ENCOUNTER — OFFICE VISIT (OUTPATIENT)
Dept: FAMILY MEDICINE CLINIC | Facility: CLINIC | Age: 40
End: 2020-10-27
Payer: MEDICARE

## 2020-10-27 VITALS
OXYGEN SATURATION: 98 % | HEART RATE: 90 BPM | DIASTOLIC BLOOD PRESSURE: 80 MMHG | SYSTOLIC BLOOD PRESSURE: 130 MMHG | TEMPERATURE: 97 F | WEIGHT: 172 LBS | BODY MASS INDEX: 31.65 KG/M2 | HEIGHT: 62 IN | RESPIRATION RATE: 16 BRPM

## 2020-10-27 DIAGNOSIS — E78.5 HYPERLIPIDEMIA, UNSPECIFIED HYPERLIPIDEMIA TYPE: ICD-10-CM

## 2020-10-27 DIAGNOSIS — M79.604 BILATERAL LEG PAIN: ICD-10-CM

## 2020-10-27 DIAGNOSIS — Z23 NEED FOR VACCINATION: ICD-10-CM

## 2020-10-27 DIAGNOSIS — E11.40 TYPE 2 DIABETES MELLITUS WITH DIABETIC NEUROPATHY, WITH LONG-TERM CURRENT USE OF INSULIN (HCC): ICD-10-CM

## 2020-10-27 DIAGNOSIS — M79.605 BILATERAL LEG PAIN: ICD-10-CM

## 2020-10-27 DIAGNOSIS — Z79.4 TYPE 2 DIABETES MELLITUS WITH DIABETIC NEUROPATHY, WITH LONG-TERM CURRENT USE OF INSULIN (HCC): ICD-10-CM

## 2020-10-27 DIAGNOSIS — I10 ESSENTIAL HYPERTENSION: ICD-10-CM

## 2020-10-27 DIAGNOSIS — Z00.00 ENCOUNTER FOR ANNUAL WELLNESS EXAM IN MEDICARE PATIENT: Primary | ICD-10-CM

## 2020-10-27 DIAGNOSIS — Z00.00 ENCOUNTER FOR ANNUAL HEALTH EXAMINATION: ICD-10-CM

## 2020-10-27 PROCEDURE — G0439 PPPS, SUBSEQ VISIT: HCPCS | Performed by: FAMILY MEDICINE

## 2020-10-27 NOTE — PROGRESS NOTES
HPI:   Angie Perales is a 38year old female who presents for a Medicare Subsequent Annual Wellness visit (Pt already had Initial Annual Wellness).     Ms. Caterina Choudhary is a pleasant 58-year-old female with history of diabetes mellitus for which she is continuo screening of functional status. Shop for groceries: Cannot do without help   She has difficulties Taking Meds as Rx'd based on screening of functional status.    Taking medications as prescribed: Cannot do without help   She has Hearing problems based on TSH 2.220 10/26/2020    CREATSERUM 0.93 10/26/2020     (H) 10/26/2020        CBC  (most recent labs)   Lab Results   Component Value Date    WBC 7.5 10/26/2020    HGB 11.1 (L) 10/26/2020    .0 10/26/2020        ALLERGIES:   She has No Know ([facility] [address] Hyperlipidemia, Neuropathy, Psoriasis, Sleep disorder, and Smith-Magenis syndrome.     She  has a past surgical history that includes shoulder arthroscopy (Right); other (Left); hc implant ear tubes; adenoidectomy; and create eardrum opening,gen anes adenopathy. Neurological: She is alert. Psychiatric: Affect and judgment normal.   Vitals reviewed.       Vaccination History     Immunization History   Administered Date(s) Administered   • Influenza 10/01/2019   • Tb Intradermal Test 03/09/2020   Pend well-being?: Social Interaction;Puzzles;Games; Visiting Family      This section provided for quick review of chart, separate sheet to patient  [address] [place] [address],[address] Internal Lab or Procedure External Lab or Procedure   Diabetes Sc found Please get once after your 65th birthday    Pneumococcal 23 (Pneumovax)  Covered Once after 65 No vaccine history found Please get once after your 65th birthday    Hepatitis B for Moderate/High Risk No vaccine history found Medium/high risk factors:

## 2020-10-27 NOTE — PATIENT INSTRUCTIONS
Thank you for choosing Edna Mary MD at [place] [address]  To Do: David Morgan  1. Please see age appropriate health prevention below      [email or web address] is located in [address]. Monday, Tuesday & Friday – 8 a.m. to 4 p.m.   Wednesday, Thurs the benefits outweigh those potential risks and we strive to make you healthier and to improve your quality of life.     Referrals, and Radiology Information:    If your insurance requires a referral to a specialist, please allow 5 business days to process age who are overweight or obese and have 1 or more additional risk factors for diabetes At least every 3 years1   Type 2 diabetes or prediabetes All women diagnosed with gestational diabetes Lifelong testing every 3 years   Type 2 diabetes All women with p this age group At routine exams   Gonorrhea Sexually active women at increased risk for infection At routine exams   Hepatitis C Anyone at increased risk; 1 time for those born between [facility] At routine exams   High cholesterol or triglycerides All Pneumococcal conjugate vaccine (PCV13) and pneumococcal polysaccharide vaccine (PPSV23) Women at increased risk for infection–talk with your healthcare provider 1 or 2 doses   Tetanus/diphtheria/pertussis (Td/Tdap) booster All women in this age group A 1 7.5 (H)    No flowsheet data found.     Fasting Blood Sugar (FSB)   Patient must be diagnosed with one of the following:   • Hypertension   • Dyslipidemia   • Obesity (BMI ³30 kg/m2)   • Previous elevated impaired FBS or GTT   … or any two of the following: uncomfortable   Glaucoma Screening      Ophthalmology Visit   Covered annually for Diabetics, people with Glaucoma family history,   Americans over age 48   Americans over age 72 No flowsheet data found.  OK to schedule if you are in this ris Illicit injectable drug abusers     Tetanus Toxoid- Only covered with a cut with metal- TD and TDaP Not covered by Medicare Part B) No orders found for this or any previous visit.  This may be covered with your prescription benefits, but Medicare does not

## 2020-10-28 ENCOUNTER — TELEPHONE (OUTPATIENT)
Dept: FAMILY MEDICINE CLINIC | Facility: CLINIC | Age: 40
End: 2020-10-28

## 2020-10-28 DIAGNOSIS — L40.50 PSORIATIC ARTHRITIS (HCC): ICD-10-CM

## 2020-10-28 DIAGNOSIS — M25.50 POLYARTHRALGIA: ICD-10-CM

## 2020-10-28 RX ORDER — MELOXICAM 15 MG/1
TABLET ORAL
Qty: 30 TABLET | Refills: 0 | OUTPATIENT
Start: 2020-10-28

## 2020-10-28 NOTE — TELEPHONE ENCOUNTER
Please send clinical chart notes that include CGM use and compiance. Medicare requires that the ordering physician assess patient every 6 months for CGM adherence in order to continue coverage of supplies.  Current documentation does not assess or discus

## 2020-10-29 ENCOUNTER — PATIENT MESSAGE (OUTPATIENT)
Dept: FAMILY MEDICINE CLINIC | Facility: CLINIC | Age: 40
End: 2020-10-29

## 2020-10-29 NOTE — TELEPHONE ENCOUNTER
From: Julia Herndon  To: Rizwana Kilgore MD  Sent: 10/29/2020 8:53 AM CDT  Subject: Non-Urgent Medical Question    Good morning Dr. LEVY was looking through Yumi's medications and don't see Simona gardner need to have it filled she takes approximately 63 units a da

## 2020-11-02 ENCOUNTER — TELEPHONE (OUTPATIENT)
Dept: FAMILY MEDICINE CLINIC | Facility: CLINIC | Age: 40
End: 2020-11-02

## 2020-11-02 NOTE — TELEPHONE ENCOUNTER
Recd request from [facility], [facility] , [place], [address], phone [phone], fax [phone], for medical records from last 3-6 months for CGM supplies. Faxed to [employer] for processing.

## 2020-11-03 RX ORDER — GABAPENTIN 300 MG/1
CAPSULE ORAL
Qty: 90 CAPSULE | Refills: 2 | Status: SHIPPED | OUTPATIENT
Start: 2020-11-03 | End: 2021-01-06

## 2020-11-03 NOTE — TELEPHONE ENCOUNTER
Requesting Gabapentin 300mg  LOV: 10/27/2020 Physical  RTC: 6 months  Last Relevant Labs: 10/26/2020  Filled: 8/12/2020 #90 with 2 refills    No future appointments.     Rx pended and routed for approval/denial

## 2020-11-10 RX ORDER — SIMVASTATIN 20 MG
TABLET ORAL
Qty: 90 TABLET | Refills: 1 | Status: SHIPPED | OUTPATIENT
Start: 2020-11-10 | End: 2021-05-20

## 2020-11-10 NOTE — TELEPHONE ENCOUNTER
Cholesterol Medication Protocol Ibpoub01/10/2020 01:50 PM   ALT < 80 Protocol Details    ALT resulted within past year     Lipid panel within past 12 months     Appointment within past 12 or next 3 months      Refill protocol passed because the patient met

## 2020-11-11 ENCOUNTER — TELEPHONE (OUTPATIENT)
Dept: FAMILY MEDICINE CLINIC | Facility: CLINIC | Age: 40
End: 2020-11-11

## 2020-11-11 ENCOUNTER — PATIENT MESSAGE (OUTPATIENT)
Dept: FAMILY MEDICINE CLINIC | Facility: CLINIC | Age: 40
End: 2020-11-11

## 2020-11-11 NOTE — TELEPHONE ENCOUNTER
From: Ruben Lorenzana  To: Logan Erwin MD  Sent: 11/11/2020 10:30 AM CST  Subject: Non-Urgent Medical Question    Good morning Dr. Gilberto Epps so I'm a little frustrated right now because I am fighting to get my CGM for my diabetes and unless your office no

## 2020-11-11 NOTE — TELEPHONE ENCOUNTER
Patient's mother states there needs to be a chart note stating CGM compliant. The note needs to go to [address] [place] [address] where we sent the order, please advise.

## 2020-11-11 NOTE — TELEPHONE ENCOUNTER
Office note from 10/27/2020 faxed to [address] [place] [address] 20, [phone], confirmation received.

## 2020-11-11 NOTE — TELEPHONE ENCOUNTER
Called pt's mother to notify that the office note has been faxed to [facility], mother verblaizes understanding and appreciation for taking care of that.

## 2020-11-12 RX ORDER — IBUPROFEN 800 MG/1
TABLET ORAL
Qty: 90 TABLET | Refills: 5 | Status: SHIPPED | OUTPATIENT
Start: 2020-11-12 | End: 2021-04-27

## 2020-11-12 NOTE — TELEPHONE ENCOUNTER
Requesting ibuprofen 800 MG  LOV: 1027/20  RTC: 6 months  Last Relevant Labs: 10/26/20  Filled: 5/26/20  # 90 with 5 refills    No future appointments.

## 2020-11-12 NOTE — TELEPHONE ENCOUNTER
Pt will need a new prescription for Ibuprofen 800mg sent to [facility] on [address].

## 2020-11-23 ENCOUNTER — TELEPHONE (OUTPATIENT)
Dept: FAMILY MEDICINE CLINIC | Facility: CLINIC | Age: 40
End: 2020-11-23

## 2020-11-23 ENCOUNTER — PATIENT MESSAGE (OUTPATIENT)
Dept: FAMILY MEDICINE CLINIC | Facility: CLINIC | Age: 40
End: 2020-11-23

## 2020-11-23 NOTE — TELEPHONE ENCOUNTER
Pts mother called-was checking on status on the request from [facility]. Informed her that request was sent to the medical records dept for processing earlier in the month.  She states that all they need are the notes from her last office visit and her las

## 2020-11-23 NOTE — TELEPHONE ENCOUNTER
From: Julia Herndon  To: Rizwana Kilgore MD  Sent: 11/23/2020 12:42 PM CST  Subject: Non-Urgent Medical Question    Dr. Isamar Hernandez if you would please sign the documentation for the CGM to the [address] [place] [address] Maybe you could do it in between patients I've been waiting a

## 2020-11-23 NOTE — TELEPHONE ENCOUNTER
From: Trevon Owen  To: Kai Jeffers MD  Sent: 11/23/2020 10:44 AM CST  Subject: Non-Urgent Medical Question    On October 26 Pett[place]one visited you for a diabetic appointment A-1 C numbers you already had from the bloodwork she gets her continuous glucose m

## 2020-12-04 NOTE — TELEPHONE ENCOUNTER
Ok to refill, last A1C 10/26/2020 7.5    Name from pharmacy: METFORMIN 1000MG TABLETS          Will file in chart as: METFORMIN HCL 1000 MG Oral Tab    Sig: TAKE 1 TABLET BY MOUTH TWICE DAILY BEFORE BREAKFAST AND DINNER    Disp:  180 tablet    Refills:  1

## 2020-12-05 RX ORDER — NORETHINDRONE AND ETHINYL ESTRADIOL 1 MG-35MCG
1 KIT ORAL DAILY
Qty: 84 TABLET | Refills: 1 | Status: SHIPPED | OUTPATIENT
Start: 2020-12-05 | End: 2021-10-07

## 2020-12-07 RX ORDER — PEN NEEDLE, DIABETIC 32GX 5/32"
1 NEEDLE, DISPOSABLE MISCELLANEOUS 4 TIMES DAILY
Qty: 400 EACH | Refills: 1 | Status: SHIPPED | OUTPATIENT
Start: 2020-12-07 | End: 2021-06-16

## 2020-12-07 NOTE — TELEPHONE ENCOUNTER
Diabetic Supplies Protocol [id number]/07/2020 07:31 AM   Appointment in the past 12 or next 3 months     Refill protocol passed because the patient met the following protocol for    Requested Prescriptions     Pending Prescriptions Disp Refills   • Insulin P

## 2020-12-09 ENCOUNTER — PATIENT MESSAGE (OUTPATIENT)
Dept: FAMILY MEDICINE CLINIC | Facility: CLINIC | Age: 40
End: 2020-12-09

## 2020-12-09 RX ORDER — TRAMADOL HYDROCHLORIDE 50 MG/1
TABLET ORAL
Qty: 180 TABLET | Refills: 1 | Status: SHIPPED | OUTPATIENT
Start: 2020-12-09 | End: 2021-02-02

## 2020-12-09 NOTE — TELEPHONE ENCOUNTER
Medication Quantity Refills Start End   traMADol HCl 50 MG Oral Tab (Discontinued) 180 tablet 1 10/1/2020 10/27/2020   Sig:   Take 2 tablets (100 mg total) by mouth 3 (three) times daily.        Last OV 10/27/2020 for physical   Future Appointments   Date T

## 2020-12-09 NOTE — TELEPHONE ENCOUNTER
From: Laney Mcdowell  To: Chris Cuevas MD  Sent: 12/9/2020 2:49 PM CST  Subject: Non-Urgent Medical Question    Hello, so I went to medication chart to refill Yumi's tramadol but the correct one is not on there Adelina Goemzes who is currently taking two tablets th

## 2021-01-06 RX ORDER — GABAPENTIN 300 MG/1
CAPSULE ORAL
Qty: 90 CAPSULE | Refills: 2 | Status: SHIPPED | OUTPATIENT
Start: 2021-01-06 | End: 2021-04-23

## 2021-01-06 NOTE — TELEPHONE ENCOUNTER
Requesting GABAPENTIN 300 MG   LOV: 10/27/20  RTC:   Last Relevant Labs: 10/26/20  Filled: 11/3/20  # 90 with 2 refills    Future Appointments   Date Time Provider Aniyah Conklin   2/22/2021 12:00 PM Gracia Perdomo, DO [facility] [address]

## 2021-01-14 ENCOUNTER — PATIENT MESSAGE (OUTPATIENT)
Dept: FAMILY MEDICINE CLINIC | Facility: CLINIC | Age: 41
End: 2021-01-14

## 2021-01-14 NOTE — TELEPHONE ENCOUNTER
From: Nikko Villatoro  To: Tomeka Lincoln MD  Sent: 1/14/2021 2:26 PM CST  Subject: Non-Urgent Jeff Christian has been complaining about pain in her right arm it's been going on long enough that I am becoming concerned it is the

## 2021-01-27 ENCOUNTER — PATIENT MESSAGE (OUTPATIENT)
Dept: FAMILY MEDICINE CLINIC | Facility: CLINIC | Age: 41
End: 2021-01-27

## 2021-01-27 NOTE — TELEPHONE ENCOUNTER
From: Archie Fernandes  To: Dave Blake MD  Sent: 1/27/2021 1:30 PM CST  Subject: Non-Urgent D[place] Sportsman Dr. Khushbu Simmons, I wanted to let you know that after speaking with you in regards to concerns of Yumi's shoulder I decided to contact her p

## 2021-02-02 RX ORDER — TRAMADOL HYDROCHLORIDE 50 MG/1
TABLET ORAL
Qty: 180 TABLET | Refills: 1 | Status: SHIPPED | OUTPATIENT
Start: 2021-02-02 | End: 2021-04-05

## 2021-02-02 NOTE — TELEPHONE ENCOUNTER
Requesting TRAMADOL HCL 50 MG  LOV: 10/27/20  RTC:   Last Relevant Labs: 10/26/20  Filled: 12/9/20  # 180 with 1 refills    Future Appointments   Date Time Provider Aniyah Conklin   2/22/2021 12:00 PM Gracia Perdomo, DO [facility] [address]     TRAM

## 2021-02-18 RX ORDER — INSULIN GLARGINE 100 [IU]/ML
INJECTION, SOLUTION SUBCUTANEOUS
Qty: 9 ML | Refills: 2 | Status: SHIPPED | OUTPATIENT
Start: 2021-02-18

## 2021-02-18 NOTE — TELEPHONE ENCOUNTER
Requesting BASAGLAR KWIKPEN 100 UNIT/ML   LOV: 10/27/20   RTC:   Last Relevant Labs: 10/26/20      Future Appointments   Date Time Provider Aniyah Conklin   2/22/2021 12:00 PM Gracia Perdomo, DO [facility] [address]

## 2021-02-22 ENCOUNTER — TELEPHONE (OUTPATIENT)
Dept: RHEUMATOLOGY | Facility: CLINIC | Age: 41
End: 2021-02-22

## 2021-02-22 ENCOUNTER — TELEMEDICINE (OUTPATIENT)
Dept: RHEUMATOLOGY | Facility: CLINIC | Age: 41
End: 2021-02-22

## 2021-02-22 VITALS — HEIGHT: 62 IN | BODY MASS INDEX: 32.2 KG/M2 | WEIGHT: 175 LBS

## 2021-02-22 DIAGNOSIS — Z79.899 HIGH RISK MEDICATION USE: ICD-10-CM

## 2021-02-22 DIAGNOSIS — L40.50 PSORIATIC ARTHRITIS (HCC): Primary | ICD-10-CM

## 2021-02-22 DIAGNOSIS — M25.50 POLYARTHRALGIA: ICD-10-CM

## 2021-02-22 DIAGNOSIS — L40.9 PSORIASIS: ICD-10-CM

## 2021-02-22 DIAGNOSIS — Q93.88 SMITH-MAGENIS SYNDROME: ICD-10-CM

## 2021-02-22 DIAGNOSIS — Z83.79 FAMILY HISTORY OF CROHN'S DISEASE: ICD-10-CM

## 2021-02-22 PROCEDURE — 99214 OFFICE O/P EST MOD 30 MIN: CPT | Performed by: INTERNAL MEDICINE

## 2021-02-22 RX ORDER — SECUKINUMAB 150 MG/ML
INJECTION SUBCUTANEOUS
Qty: 10 PEN | Refills: 1 | Status: SHIPPED | OUTPATIENT
Start: 2021-02-22 | End: 2021-05-06

## 2021-02-22 NOTE — PROGRESS NOTES
[facility] TeleHealth Audio and Visual Visit   In Office visit canceled due to coronavirus pandemic    This was an audio/video conversation using [employer] in lieu of an in-person visit due to need to limit person to person contact during the juliano medical history including Smith-Magenis syndrome (chromosomal abnormality) who presented to re-establish care with rheumatology.  She was previously diagnosed with psoriasis and arthritis, however was told [place] rheumatologist [place] that she did not h medicine. We will need to check for occult infections including hepatitis virus and tuberculosis prior to starting the medicine. Patient and pt's mother verbalized understanding of above instructions. No further questions at this time.       Code select most part for patient. Since her last visit, she continues to have pain in the arms, hands, legs and knees. Different days, she has different areas of pain. Continues to have skin issues with roughness.   She did try methotrexate for about a month but swelling of her hands- about 4 times total, most recently was September 2019. Has been given medrol dose pack which helps with her pain/symptoms but has trouble with blood sugars.      Hx of slight scoliosis, thought related to her underlying condition. Smoking status: Never Smoker      Smokeless tobacco: Never Used    Alcohol use: Never      Frequency: Never    Drug use: Never    Medications:    •  Secukinumab (COSENTYX SENSOREADY PEN) 150 MG/ML Subcutaneous Solution Auto-injector, With a loading dose: Does not apply route. Test glucose four times daily, Disp: , Rfl:     •  Continuous Blood Gluc  (Kitsy LaneYLE KASSANDRA 14 DAY READER) Does not apply Device, by Does not apply route.  Test sugars four times daily, Disp: , Rfl:       Modified Medications Effort normal. No respiratory distress. Musculoskeletal:         General: Tenderness, deformity and edema present. Comments: Swelling and appearance of dactylitis of right pinky finger, and across MCPs diffusely R>L hand.   No other obvious DIP, PIPs straightening of the normal cervical lordosis. No significant spondylolisthesis is present. Vertebral bodies appear maintained in height.   Moderate facet arthropathy is present at L5-S1.                   =====  CONCLUSION:  Degenerative changes are pres 88 10/26/2020    CA 8.8 10/26/2020    OSMOCALC 284 10/26/2020    ALKPHO 70 10/26/2020    AST 11 (L) 10/26/2020    ALT 32 10/26/2020    BILT 0.3 10/26/2020    TP 7.2 10/26/2020    ALB 3.4 10/26/2020    GLOBULIN 3.8 10/26/2020     (L) 10/26/2020    K 4

## 2021-03-24 ENCOUNTER — TELEPHONE (OUTPATIENT)
Dept: RHEUMATOLOGY | Facility: CLINIC | Age: 41
End: 2021-03-24

## 2021-03-24 NOTE — TELEPHONE ENCOUNTER
Returned call to mom; aware PA approved per [facility]. # given to contact them re: delivery of Cosentyx. Agrees to call this office if any difficulties.

## 2021-03-24 NOTE — TELEPHONE ENCOUNTER
PA for deanna approved. ABD has tried to reach out to patient, pt unavailable. This RN also attempted to call pt, message block full.

## 2021-04-01 ENCOUNTER — MED REC SCAN ONLY (OUTPATIENT)
Dept: FAMILY MEDICINE CLINIC | Facility: CLINIC | Age: 41
End: 2021-04-01

## 2021-04-05 RX ORDER — TRAMADOL HYDROCHLORIDE 50 MG/1
TABLET ORAL
Qty: 180 TABLET | Refills: 1 | Status: SHIPPED | OUTPATIENT
Start: 2021-04-05 | End: 2021-06-02

## 2021-04-05 NOTE — TELEPHONE ENCOUNTER
Requested Prescriptions     Pending Prescriptions Disp Refills   • TRAMADOL HCL 50 MG Oral Tab [Pharmacy Med Name: TRAMADOL HCL 50 MG TABLET] 180 tablet 1     Sig: TAKE 2 TABLETS BY MOUTH 3 TIMES A DAY     LOV: 10/27/20  RTC:   Last Relevant Labs: 10/26/20

## 2021-04-16 ENCOUNTER — TELEPHONE (OUTPATIENT)
Dept: RHEUMATOLOGY | Facility: CLINIC | Age: 41
End: 2021-04-16

## 2021-04-16 NOTE — TELEPHONE ENCOUNTER
ABD phoned office to clarify cosentyx prescription. This Rn talked to [address] in office, pt having increased pain has been taking 150 mg weekly, may increase 5th load dose to 300 mg--then 300mg q 4 weeks. Spoke with pharmacist Sherlyn Girard. Verbal order given.

## 2021-04-21 ENCOUNTER — TELEMEDICINE (OUTPATIENT)
Dept: FAMILY MEDICINE CLINIC | Facility: CLINIC | Age: 41
End: 2021-04-21

## 2021-04-21 ENCOUNTER — PATIENT MESSAGE (OUTPATIENT)
Dept: FAMILY MEDICINE CLINIC | Facility: CLINIC | Age: 41
End: 2021-04-21

## 2021-04-21 DIAGNOSIS — R11.10 NON-INTRACTABLE VOMITING, PRESENCE OF NAUSEA NOT SPECIFIED, UNSPECIFIED VOMITING TYPE: ICD-10-CM

## 2021-04-21 DIAGNOSIS — R50.9 FEVER, UNSPECIFIED FEVER CAUSE: Primary | ICD-10-CM

## 2021-04-21 DIAGNOSIS — Z79.4 DIABETES MELLITUS DUE TO UNDERLYING CONDITION WITH HYPERGLYCEMIA, WITH LONG-TERM CURRENT USE OF INSULIN (HCC): ICD-10-CM

## 2021-04-21 DIAGNOSIS — H10.31 ACUTE CONJUNCTIVITIS OF RIGHT EYE, UNSPECIFIED ACUTE CONJUNCTIVITIS TYPE: ICD-10-CM

## 2021-04-21 DIAGNOSIS — E08.65 DIABETES MELLITUS DUE TO UNDERLYING CONDITION WITH HYPERGLYCEMIA, WITH LONG-TERM CURRENT USE OF INSULIN (HCC): ICD-10-CM

## 2021-04-21 PROCEDURE — 99214 OFFICE O/P EST MOD 30 MIN: CPT | Performed by: FAMILY MEDICINE

## 2021-04-21 RX ORDER — POLYMYXIN B SULFATE AND TRIMETHOPRIM 1; 10000 MG/ML; [USP'U]/ML
1 SOLUTION OPHTHALMIC EVERY 4 HOURS
Qty: 1 BOTTLE | Refills: 0 | Status: SHIPPED | OUTPATIENT
Start: 2021-04-21

## 2021-04-21 NOTE — PROGRESS NOTES
Video Visit- Sarahy   This is a telemedicine visit with live, interactive video and audio.      Yosvany Duvall understands and accepts financial responsibility for any deductible, co-insurance and/or co-pays a Neuropathy    • Psoriasis    • Sleep disorder    • Smith-Magenis syndrome        Past Surgical History:   Procedure Laterality Date   • ADENOIDECTOMY     • CREATE EARDRUM OPENING,GEN ANESTH     • HC IMPLANT EAR TUBES     • OTHER Left     left arm fracture symptoms worsen/don't improve or fever persisting > 3 days. Ivor Schilder, MD      Please note that the following visit was completed using two-way, real-time interactive audio and visual communication.  This has been done in good maggie to provide

## 2021-04-21 NOTE — TELEPHONE ENCOUNTER
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 I have given her Tylenol she was sick yesterday as well with a fever.  I also am very concerned about her sugar levels th

## 2021-04-22 ENCOUNTER — TELEMEDICINE (OUTPATIENT)
Dept: FAMILY MEDICINE CLINIC | Facility: CLINIC | Age: 41
End: 2021-04-22

## 2021-04-22 DIAGNOSIS — R50.9 FEVER, UNSPECIFIED FEVER CAUSE: Primary | ICD-10-CM

## 2021-04-22 PROCEDURE — 99213 OFFICE O/P EST LOW 20 MIN: CPT | Performed by: FAMILY MEDICINE

## 2021-04-22 RX ORDER — AMOXICILLIN 500 MG/1
500 CAPSULE ORAL 3 TIMES DAILY
Qty: 30 CAPSULE | Refills: 0 | Status: SHIPPED | OUTPATIENT
Start: 2021-04-22 | End: 2021-06-14

## 2021-04-22 NOTE — PROGRESS NOTES
HPI/Subjective:   Patient ID: Neil Jeffers is a 38year old female. HPI  Ms. Vasques Her is a pleasant 79-year-old female with known history of insulin-dependent diabetes mellitus presenting for a video visit follow-up for fever.   I had been smoking to he 2   • Insulin Pen Needle (BD PEN NEEDLE JESSICA U/F) 32G X 4 MM Does not apply Misc Inject 1 pen into the skin 4 (four) times daily.  DX E11.40 400 each 1   • ALYACEN 1/35 1-35 MG-MCG Oral Tab TAKE 1 TABLET BY MOUTH DAILY 84 tablet 1   • METFORMIN HCL 1000 MG time was spent in counseling and/or coordination of care related to fever. No orders of the defined types were placed in this encounter.       Meds This Visit:  Requested Prescriptions     Signed Prescriptions Disp Refills   • amoxicillin 500 MG Oral Cap

## 2021-04-23 RX ORDER — LISINOPRIL 5 MG/1
TABLET ORAL
Qty: 180 TABLET | Refills: 1 | Status: SHIPPED | OUTPATIENT
Start: 2021-04-23 | End: 2022-01-24

## 2021-04-23 RX ORDER — GABAPENTIN 300 MG/1
CAPSULE ORAL
Qty: 90 CAPSULE | Refills: 2 | Status: SHIPPED | OUTPATIENT
Start: 2021-04-23 | End: 2021-08-05

## 2021-04-23 NOTE — TELEPHONE ENCOUNTER
Future Appointments   Date Time Provider Aniyah Conklin   9/27/2021 11:30 AM Gracia Perdomo, DO [facility] [address]     Medication pended if okay to refill.

## 2021-04-27 ENCOUNTER — PATIENT MESSAGE (OUTPATIENT)
Dept: FAMILY MEDICINE CLINIC | Facility: CLINIC | Age: 41
End: 2021-04-27

## 2021-04-27 RX ORDER — IBUPROFEN 800 MG/1
TABLET ORAL
Qty: 90 TABLET | Refills: 5 | Status: SHIPPED | OUTPATIENT
Start: 2021-04-27 | End: 2021-12-13

## 2021-04-27 NOTE — TELEPHONE ENCOUNTER
From: Neil Standing  To: Harris Hinton MD  Sent: 4/27/2021 12:46 PM CDT  Subject: Non-Urgent Medical Question    Just wanted to let you know that Day Aquino is in full recovery, will finish the anabiotic's. six hours after I gave to her she improved.   I never

## 2021-04-27 NOTE — TELEPHONE ENCOUNTER
Requested Prescriptions     Pending Prescriptions Disp Refills   • ibuprofen 800 MG Oral Tab [Pharmacy Med Name: IBUPROFEN 800 MG TABLET] 90 tablet 5     Sig: TAKE 1 TABLET BY MOUTH THREE TIMES A DAY     LOV: 4/22/21 (virtual)  RTC:   Last Relevant Labs: 1

## 2021-05-06 ENCOUNTER — OFFICE VISIT (OUTPATIENT)
Dept: FAMILY MEDICINE CLINIC | Facility: CLINIC | Age: 41
End: 2021-05-06
Payer: MEDICARE

## 2021-05-06 VITALS
HEIGHT: 62 IN | RESPIRATION RATE: 16 BRPM | BODY MASS INDEX: 31.83 KG/M2 | WEIGHT: 173 LBS | DIASTOLIC BLOOD PRESSURE: 78 MMHG | OXYGEN SATURATION: 98 % | HEART RATE: 68 BPM | SYSTOLIC BLOOD PRESSURE: 118 MMHG | TEMPERATURE: 98 F

## 2021-05-06 DIAGNOSIS — Z79.4 TYPE 2 DIABETES MELLITUS WITH DIABETIC NEUROPATHY, WITH LONG-TERM CURRENT USE OF INSULIN (HCC): Primary | ICD-10-CM

## 2021-05-06 DIAGNOSIS — I10 ESSENTIAL HYPERTENSION: ICD-10-CM

## 2021-05-06 DIAGNOSIS — E11.40 TYPE 2 DIABETES MELLITUS WITH DIABETIC NEUROPATHY, WITH LONG-TERM CURRENT USE OF INSULIN (HCC): Primary | ICD-10-CM

## 2021-05-06 PROCEDURE — 99214 OFFICE O/P EST MOD 30 MIN: CPT | Performed by: FAMILY MEDICINE

## 2021-05-06 RX ORDER — SECUKINUMAB 150 MG/ML
300 INJECTION SUBCUTANEOUS
COMMUNITY
Start: 2021-04-21 | End: 2021-05-24

## 2021-05-06 NOTE — PROGRESS NOTES
HPI/Subjective:   Patient ID: Jasmeet Katz is a 38year old female. HPI  Ms. Destin Jones is a pleasant 48-year-old female with history of diabetes mellitus for which she is continuous glucose monitor compliant, hypertension, hyperlipidemia, Smith–Magenis use in other eye if becomes affected 1 Bottle 0   • TRAMADOL HCL 50 MG Oral Tab TAKE 2 TABLETS BY MOUTH 3 TIMES A  tablet 1   • BASAGLAR KWIKPEN 100 UNIT/ML Subcutaneous Solution Pen-injector INJECT 8 U UNDER THE SKIN QHS 9 mL 2   • Insulin Pen Need sounds. No murmur heard. Pulmonary:      Effort: Pulmonary effort is normal. No respiratory distress. Breath sounds: Normal breath sounds. No wheezing or rales. Abdominal:      General: Bowel sounds are normal.      Palpations: Abdomen is soft.

## 2021-05-10 ENCOUNTER — PATIENT MESSAGE (OUTPATIENT)
Dept: FAMILY MEDICINE CLINIC | Facility: CLINIC | Age: 41
End: 2021-05-10

## 2021-05-10 RX ORDER — AMOXICILLIN 500 MG/1
CAPSULE ORAL
Qty: 30 CAPSULE | Refills: 0 | OUTPATIENT
Start: 2021-05-10

## 2021-05-10 NOTE — TELEPHONE ENCOUNTER
From: Harlan Moss  To: Vic Graham MD  Sent: 5/10/2021 6:43 AM CDT  Subject: Other    Good morning, Sarah Dawson has woke up today with a fever of 101.6 please send in a script for antibiotics until we figure out what is causing this. As soon as possible.  Abel Cardenas

## 2021-05-11 ENCOUNTER — TELEPHONE (OUTPATIENT)
Dept: FAMILY MEDICINE CLINIC | Facility: CLINIC | Age: 41
End: 2021-05-11

## 2021-05-11 NOTE — TELEPHONE ENCOUNTER
[facility] sent request asking for recent clinical, device downloads, and A1C results for continuation of DME CGM supplies.    OV and A1C from 5/6/21 faxed to [phone]

## 2021-05-20 RX ORDER — SIMVASTATIN 20 MG
TABLET ORAL
Qty: 90 TABLET | Refills: 1 | Status: SHIPPED | OUTPATIENT
Start: 2021-05-20 | End: 2021-08-26

## 2021-05-20 NOTE — TELEPHONE ENCOUNTER
Cholesterol Medication Protocol Cyveof3805/20/2021 12:24 AM   ALT < 80 Protocol Details    ALT resulted within past year     Lipid panel within past 12 months     Appointment within past 12 or next 3 months      Refill protocol passed because the patient met

## 2021-05-23 ENCOUNTER — PATIENT MESSAGE (OUTPATIENT)
Dept: RHEUMATOLOGY | Facility: CLINIC | Age: 41
End: 2021-05-23

## 2021-05-24 RX ORDER — SECUKINUMAB 150 MG/ML
300 INJECTION SUBCUTANEOUS
Qty: 1 PEN | Refills: 3 | Status: SHIPPED | OUTPATIENT
Start: 2021-05-24 | End: 2021-10-07

## 2021-05-24 NOTE — TELEPHONE ENCOUNTER
LOV 2-22-21 virtual  Future Appointments   Date Time Provider Aniyah Katerin   9/27/2021 11:30 AM Gracia Perdomo,  [facility] [address]

## 2021-05-24 NOTE — TELEPHONE ENCOUNTER
From: Mariam Mcintosh  To: Rachelle Ha DO  Sent: 5/23/2021 1:47 PM CDT  Subject: Non-Urgent Medical Question    Please send cosentyx refill I don't know name of pharmacy. Thought I had dose for May but I do not! Injection was due today. ..  300mg Thank You

## 2021-05-30 ENCOUNTER — TELEPHONE (OUTPATIENT)
Dept: RHEUMATOLOGY | Facility: CLINIC | Age: 41
End: 2021-05-30

## 2021-05-30 DIAGNOSIS — Z11.59 NEED FOR HEPATITIS B SCREENING TEST: Primary | ICD-10-CM

## 2021-05-30 NOTE — TELEPHONE ENCOUNTER
Please call pharmacy, cancel further refills of Cosentyx until QuantiFERON gold (no baseline ever) and CMP/CBC with differential is obtained. Call patient and let her know to get these labs done ASAP.

## 2021-06-01 NOTE — TELEPHONE ENCOUNTER
Phoned pt, LVM for pt to call our office. Sent a [employer]. Phoned [facility] -- shipment of Cosentyx sent out last Thursday for delivery Friday.

## 2021-06-02 RX ORDER — TRAMADOL HYDROCHLORIDE 50 MG/1
TABLET ORAL
Qty: 180 TABLET | Refills: 1 | Status: SHIPPED | OUTPATIENT
Start: 2021-06-02 | End: 2021-08-02

## 2021-06-02 RX ORDER — INSULIN ASPART 100 [IU]/ML
INJECTION, SOLUTION INTRAVENOUS; SUBCUTANEOUS
Qty: 10 EACH | Refills: 3 | Status: SHIPPED | OUTPATIENT
Start: 2021-06-02

## 2021-06-02 NOTE — TELEPHONE ENCOUNTER
Requested Prescriptions     Pending Prescriptions Disp Refills   • Insulin Aspart Pen (NOVOLOG FLEXPEN) 100 UNIT/ML Subcutaneous Solution Pen-injector [Pharmacy Med Name: NOVOLOG 100 UNIT/ML FLEXPEN]  3     Sig: USE 4 TIMES A DAY ACCORDING TO SLIDING SCALE

## 2021-06-14 RX ORDER — AMOXICILLIN 500 MG/1
CAPSULE ORAL
Qty: 30 CAPSULE | Refills: 0 | Status: SHIPPED | OUTPATIENT
Start: 2021-06-14 | End: 2021-10-07 | Stop reason: ALTCHOICE

## 2021-06-16 RX ORDER — PEN NEEDLE, DIABETIC 32GX 5/32"
NEEDLE, DISPOSABLE MISCELLANEOUS
Qty: 400 EACH | Refills: 1 | Status: SHIPPED | OUTPATIENT
Start: 2021-06-16 | End: 2021-12-21

## 2021-06-16 NOTE — TELEPHONE ENCOUNTER
Phoned pt, spoke with pt mother. Reminded pt to have blood work completed as soon as possible. Would like completed prior to taking her cosentyx. Verbalized understanding. States she will have completed as soon as possible.  Number to central scheduling giv

## 2021-06-16 NOTE — TELEPHONE ENCOUNTER
Diabetic Supplies Protocol [id number]/16/2021 01:11 PM   Appointment in the past 12 or next 3 months     Refill protocol passed because the patient met the following protocol for    Requested Prescriptions     Pending Prescriptions Disp Refills   • BD PEN NE

## 2021-06-18 ENCOUNTER — LAB ENCOUNTER (OUTPATIENT)
Dept: LAB | Age: 41
End: 2021-06-18
Attending: INTERNAL MEDICINE
Payer: MEDICARE

## 2021-06-18 DIAGNOSIS — D64.9 ANEMIA, UNSPECIFIED TYPE: ICD-10-CM

## 2021-06-18 DIAGNOSIS — L40.50 PSORIATIC ARTHRITIS (HCC): ICD-10-CM

## 2021-06-18 DIAGNOSIS — Z11.59 NEED FOR HEPATITIS B SCREENING TEST: ICD-10-CM

## 2021-06-18 DIAGNOSIS — Z79.899 HIGH RISK MEDICATION USE: ICD-10-CM

## 2021-06-18 PROCEDURE — 36415 COLL VENOUS BLD VENIPUNCTURE: CPT

## 2021-06-18 PROCEDURE — 86140 C-REACTIVE PROTEIN: CPT

## 2021-06-18 PROCEDURE — 82728 ASSAY OF FERRITIN: CPT

## 2021-06-18 PROCEDURE — 85025 COMPLETE CBC W/AUTO DIFF WBC: CPT

## 2021-06-18 PROCEDURE — 86480 TB TEST CELL IMMUN MEASURE: CPT

## 2021-06-18 PROCEDURE — 80053 COMPREHEN METABOLIC PANEL: CPT

## 2021-06-18 PROCEDURE — 86704 HEP B CORE ANTIBODY TOTAL: CPT

## 2021-06-20 ENCOUNTER — TELEPHONE (OUTPATIENT)
Dept: RHEUMATOLOGY | Facility: CLINIC | Age: 41
End: 2021-06-20

## 2021-06-21 NOTE — PROGRESS NOTES
Let the patient know the following:    Dear Lily Christian,    I am covering for Dr. Desi Tejeda during her leave. Inflammation is higher than previously. It may be from the arthritis, but have you had a recent infection?   Iron level is better  Blood count,

## 2021-06-22 ENCOUNTER — TELEPHONE (OUTPATIENT)
Dept: RHEUMATOLOGY | Facility: CLINIC | Age: 41
End: 2021-06-22

## 2021-06-22 NOTE — TELEPHONE ENCOUNTER
Called patient. Patient's mom picked up. Sid Rubio has been suffering with a recent root canal that developed into a fistula and infection. Developed a fever placed on antibiotics and source control was obtained.   Recently had more fevers was put on antibiot

## 2021-06-22 NOTE — PROGRESS NOTES
Patient will need to stop cosentyx until patient is done with antibiotics, have the patient call back after she is done with antibiotics to determine if she needs sooner f/u.

## 2021-07-06 DIAGNOSIS — H10.31 ACUTE CONJUNCTIVITIS OF RIGHT EYE, UNSPECIFIED ACUTE CONJUNCTIVITIS TYPE: ICD-10-CM

## 2021-07-06 NOTE — TELEPHONE ENCOUNTER
Requested Prescriptions     Pending Prescriptions Disp Refills   • Loteprednol-Tobramycin (ZYLET) 0.5-0.3 % Ophthalmic Suspension 10 mL 3     Sig: Apply 4 drops to eye daily.      LOV: 5/6/21  RTC:   Last Relevant Labs:   Filled: 5/26/20 # 10ml with 3 refil

## 2021-07-07 RX ORDER — LOTEPREDNOL ETABONATE AND TOBRAMYCIN 5; 3 MG/ML; MG/ML
4 SUSPENSION/ DROPS OPHTHALMIC DAILY
Qty: 10 ML | Refills: 3 | Status: SHIPPED | OUTPATIENT
Start: 2021-07-07

## 2021-07-07 RX ORDER — POLYMYXIN B SULFATE AND TRIMETHOPRIM 1; 10000 MG/ML; [USP'U]/ML
1 SOLUTION OPHTHALMIC EVERY 4 HOURS
Qty: 10 ML | Refills: 0 | Status: CANCELLED | OUTPATIENT
Start: 2021-07-07

## 2021-07-08 NOTE — TELEPHONE ENCOUNTER
I just erxed other antibiotic drops yesterday. Please clarify why needing 2 sets of antibiotic eye drops? sxs?

## 2021-08-02 RX ORDER — TRAMADOL HYDROCHLORIDE 50 MG/1
TABLET ORAL
Qty: 180 TABLET | Refills: 1 | Status: SHIPPED | OUTPATIENT
Start: 2021-08-02 | End: 2021-09-29

## 2021-08-02 NOTE — TELEPHONE ENCOUNTER
Requesting Tramadol 50mg  LOV: 5/6/21  RTC: prn  Last Relevant Labs: 10/26/2020  Filled: 6/2/21 #180 with 1 refills    Future Appointments   Date Time Provider Aniyah Conklin   9/27/2021 11:30 AM Gracia Perdomo, DO [facility] [address]     Rx pended

## 2021-08-05 RX ORDER — GABAPENTIN 300 MG/1
CAPSULE ORAL
Qty: 90 CAPSULE | Refills: 2 | Status: SHIPPED | OUTPATIENT
Start: 2021-08-05 | End: 2021-12-13

## 2021-08-26 RX ORDER — SIMVASTATIN 20 MG
20 TABLET ORAL NIGHTLY
Qty: 90 TABLET | Refills: 1 | Status: SHIPPED | OUTPATIENT
Start: 2021-08-26

## 2021-08-26 NOTE — TELEPHONE ENCOUNTER
Cholesterol Medication Protocol [id number]/26/2021 10:44 AM   ALT < 80 Protocol Details    ALT resulted within past year     Lipid panel within past 12 months     Appointment within past 12 or next 3 months      Refill protocol passed because the patient met

## 2021-09-29 RX ORDER — TRAMADOL HYDROCHLORIDE 50 MG/1
TABLET ORAL
Qty: 180 TABLET | Refills: 1 | Status: SHIPPED | OUTPATIENT
Start: 2021-09-29 | End: 2021-11-28

## 2021-10-07 ENCOUNTER — OFFICE VISIT (OUTPATIENT)
Dept: FAMILY MEDICINE CLINIC | Facility: CLINIC | Age: 41
End: 2021-10-07
Payer: MEDICARE

## 2021-10-07 VITALS
OXYGEN SATURATION: 98 % | TEMPERATURE: 98 F | RESPIRATION RATE: 18 BRPM | WEIGHT: 174 LBS | BODY MASS INDEX: 32.02 KG/M2 | HEIGHT: 62 IN | DIASTOLIC BLOOD PRESSURE: 80 MMHG | HEART RATE: 90 BPM | SYSTOLIC BLOOD PRESSURE: 118 MMHG

## 2021-10-07 DIAGNOSIS — Q93.88 SMITH-MAGENIS SYNDROME: ICD-10-CM

## 2021-10-07 DIAGNOSIS — E11.40 TYPE 2 DIABETES MELLITUS WITH DIABETIC NEUROPATHY, WITH LONG-TERM CURRENT USE OF INSULIN (HCC): Primary | ICD-10-CM

## 2021-10-07 DIAGNOSIS — Z79.4 TYPE 2 DIABETES MELLITUS WITH DIABETIC NEUROPATHY, WITH LONG-TERM CURRENT USE OF INSULIN (HCC): Primary | ICD-10-CM

## 2021-10-07 PROCEDURE — 99214 OFFICE O/P EST MOD 30 MIN: CPT | Performed by: FAMILY MEDICINE

## 2021-10-07 PROCEDURE — 83036 HEMOGLOBIN GLYCOSYLATED A1C: CPT | Performed by: FAMILY MEDICINE

## 2021-10-07 NOTE — PROGRESS NOTES
Subjective:   Patient ID: Josh Dean is a 42year old female. HPI  Ms. Zamora is a pleasant 48-year-old female with history of diabetes mellitus for which she is continuous glucose monitor compliant, hypertension, hyperlipidemia, Renato, pso ACCORDING TO SLIDING SCALE 10 each 3   • COSENTYX SENSOREADY, 300 MG, 150 MG/ML Subcutaneous Solution Auto-injector Inject 300 mg as directed every 30 (thirty) days.  1 pen 3   • ibuprofen 800 MG Oral Tab TAKE 1 TABLET BY MOUTH THREE TIMES A DAY 90 tablet 5 respiratory distress. Breath sounds: Normal breath sounds. No wheezing or rales. Abdominal:      General: Bowel sounds are normal.      Palpations: Abdomen is soft. Musculoskeletal:      Cervical back: Neck supple. Right lower leg: No edema.

## 2021-10-07 NOTE — PATIENT INSTRUCTIONS
Thank you for choosing Lisa Wesley MD at [place] [address]  To Do: Jasmeet Katz  1. Please take meds as directed. [facility] is located in [address]. Monday, Tuesday & Friday – 8 a.m. to 4 p.m. Wednesday, Thursday – 7 a.m. to 3 p.m. those potential risks and we strive to make you healthier and to improve your quality of life.     Referrals, and Radiology Information:    If your insurance requires a referral to a specialist, please allow 5 business days to process your referral request. sugar include fruits and fruit juices, dairy products, honey, and molasses. Added sugars are found in most desserts, processed foods, candy, regular soda, and fruit drinks. These are very helpful to treat low blood sugar (hypoglycemia).  They give you sugar soup  · 1/2 cup of casserole  · 6 chicken nuggets  · 2-inch-square brownie or cake without frosting  · 2 small cookies  · 1/2 cup of ice cream or sherbet  Carb counting is easier when food labels are available.  Look at the label to see how many grams of to help you figure out how many grams of carbs you should have. José Miguel last reviewed this educational content on 11/1/2019  © 5212-8850 [employer]. All rights reserved.  This information is not intended as a substitute for professional medic

## 2021-11-07 NOTE — TELEPHONE ENCOUNTER
Rapid Response Requesting traMADol HCl 50 MG   LOV: 3/9/20  RTC:   Last Relevant Labs:   Filled: 3/9/20 # 120 with 5 refills    Future Appointments   Date Time Provider Aniyah Conklin   6/1/2020  2:30 PM Gracia Perdomo, DO [facility] [address]     Patient is reque

## 2021-11-24 NOTE — TELEPHONE ENCOUNTER
Requesting Tramadol 50mg  LOV: 10/7/21  RTC: 6 months  Last Relevant Labs: 10/26/2020  Filled: 9/29/21 #180 with 1 refills    No future appointments.     Rx pended and routed for approval/denial

## 2021-11-28 RX ORDER — TRAMADOL HYDROCHLORIDE 50 MG/1
100 TABLET ORAL 3 TIMES DAILY
Qty: 180 TABLET | Refills: 1 | OUTPATIENT
Start: 2021-11-28

## 2021-11-28 RX ORDER — TRAMADOL HYDROCHLORIDE 50 MG/1
TABLET ORAL
Qty: 180 TABLET | Refills: 1 | Status: SHIPPED | OUTPATIENT
Start: 2021-11-28 | End: 2022-01-24

## 2021-12-13 RX ORDER — IBUPROFEN 800 MG/1
TABLET ORAL
Qty: 90 TABLET | Refills: 5 | Status: SHIPPED | OUTPATIENT
Start: 2021-12-13

## 2021-12-13 RX ORDER — GABAPENTIN 300 MG/1
CAPSULE ORAL
Qty: 90 CAPSULE | Refills: 2 | Status: SHIPPED | OUTPATIENT
Start: 2021-12-13

## 2021-12-21 RX ORDER — PEN NEEDLE, DIABETIC 32GX 5/32"
NEEDLE, DISPOSABLE MISCELLANEOUS
Qty: 400 EACH | Refills: 2 | Status: SHIPPED | OUTPATIENT
Start: 2021-12-21

## 2021-12-21 NOTE — TELEPHONE ENCOUNTER
Diabetic Supplies Protocol Passed 12/21/2021 12:40 PM    Appointment in the past 12 or next 3 months     Refill protocol passed because the patient met the following protocol for    Requested Prescriptions     Pending Prescriptions Disp Refills   • BD PEN

## 2022-01-12 ENCOUNTER — TELEPHONE (OUTPATIENT)
Dept: FAMILY MEDICINE CLINIC | Facility: CLINIC | Age: 42
End: 2022-01-12

## 2022-01-12 NOTE — TELEPHONE ENCOUNTER
Recd request from [facility], [address], phone [phone] and fax [phone] for office visit notes within the last 3-6 months for continuation of CGM supplies. Faxed office visit notes from 10/7/2021 to [phone].

## 2022-01-24 RX ORDER — LISINOPRIL 5 MG/1
TABLET ORAL
Qty: 180 TABLET | Refills: 1 | Status: SHIPPED | OUTPATIENT
Start: 2022-01-24

## 2022-01-24 RX ORDER — TRAMADOL HYDROCHLORIDE 50 MG/1
TABLET ORAL
Qty: 180 TABLET | Refills: 1 | Status: SHIPPED | OUTPATIENT
Start: 2022-01-24

## 2022-03-07 RX ORDER — LOTEPREDNOL ETABONATE AND TOBRAMYCIN 5; 3 MG/ML; MG/ML
4 SUSPENSION/ DROPS OPHTHALMIC DAILY
Qty: 10 ML | Refills: 3 | Status: SHIPPED | OUTPATIENT
Start: 2022-03-07

## 2022-03-21 RX ORDER — TRAMADOL HYDROCHLORIDE 50 MG/1
TABLET ORAL
Qty: 180 TABLET | Refills: 1 | Status: SHIPPED | OUTPATIENT
Start: 2022-03-21

## 2022-04-04 ENCOUNTER — PATIENT MESSAGE (OUTPATIENT)
Dept: FAMILY MEDICINE CLINIC | Facility: CLINIC | Age: 42
End: 2022-04-04

## 2022-04-04 NOTE — TELEPHONE ENCOUNTER
From: Samson Graham  To: Linda Martinez  Sent: 4/4/2022 8:48 AM CDT  Subject: Nery Cummings    Good morning, Enio Fox We ran your insurance information for your upcoming appointment. Unfortunately, we do not accept this insurance, which is [employer]. We will not be able to see you today. Please call the office so that we are aware that you have received this message.   Thank you,  Daniel Arellano  Patient Services Representative Lead

## 2022-04-04 NOTE — TELEPHONE ENCOUNTER
Pt called back and will have the previous insurance as of 5-1-22. Pt rescheduled the appt.   Future Appointments   Date Time Provider Aniyah Conklin   5/4/2022  2:30 PM Jaspreet Del Real MD [facility] [address] [facility] [address]

## 2022-05-04 ENCOUNTER — OFFICE VISIT (OUTPATIENT)
Dept: FAMILY MEDICINE CLINIC | Facility: CLINIC | Age: 42
End: 2022-05-04
Payer: MEDICARE

## 2022-05-04 VITALS
DIASTOLIC BLOOD PRESSURE: 76 MMHG | SYSTOLIC BLOOD PRESSURE: 120 MMHG | TEMPERATURE: 97 F | WEIGHT: 163 LBS | RESPIRATION RATE: 16 BRPM | BODY MASS INDEX: 30 KG/M2 | HEIGHT: 62 IN | HEART RATE: 94 BPM | OXYGEN SATURATION: 98 %

## 2022-05-04 DIAGNOSIS — I10 ESSENTIAL HYPERTENSION: ICD-10-CM

## 2022-05-04 DIAGNOSIS — E11.40 TYPE 2 DIABETES MELLITUS WITH DIABETIC NEUROPATHY, WITH LONG-TERM CURRENT USE OF INSULIN (HCC): ICD-10-CM

## 2022-05-04 DIAGNOSIS — Z79.4 TYPE 2 DIABETES MELLITUS WITH DIABETIC NEUROPATHY, WITH LONG-TERM CURRENT USE OF INSULIN (HCC): ICD-10-CM

## 2022-05-04 DIAGNOSIS — Z00.00 ENCOUNTER FOR ANNUAL WELLNESS EXAM IN MEDICARE PATIENT: Primary | ICD-10-CM

## 2022-05-04 DIAGNOSIS — Z00.00 ENCOUNTER FOR ANNUAL HEALTH EXAMINATION: ICD-10-CM

## 2022-05-04 PROBLEM — H92.02 LEFT EAR PAIN: Status: RESOLVED | Noted: 2020-02-05 | Resolved: 2022-05-04

## 2022-05-04 PROCEDURE — G0439 PPPS, SUBSEQ VISIT: HCPCS | Performed by: FAMILY MEDICINE

## 2022-05-05 RX ORDER — SIMVASTATIN 20 MG
20 TABLET ORAL NIGHTLY
Qty: 90 TABLET | Refills: 1 | Status: SHIPPED | OUTPATIENT
Start: 2022-05-05

## 2022-05-16 RX ORDER — TRAMADOL HYDROCHLORIDE 50 MG/1
TABLET ORAL
Qty: 180 TABLET | Refills: 1 | Status: SHIPPED | OUTPATIENT
Start: 2022-05-16

## 2022-06-23 RX ORDER — INSULIN GLARGINE 100 [IU]/ML
INJECTION, SOLUTION SUBCUTANEOUS
Qty: 15 EACH | Refills: 2 | Status: SHIPPED | OUTPATIENT
Start: 2022-06-23

## 2022-06-23 RX ORDER — INSULIN ASPART 100 [IU]/ML
INJECTION, SOLUTION INTRAVENOUS; SUBCUTANEOUS
Qty: 30 EACH | Refills: 3 | Status: SHIPPED | OUTPATIENT
Start: 2022-06-23

## 2022-07-04 RX ORDER — IBUPROFEN 800 MG/1
TABLET ORAL
Qty: 90 TABLET | Refills: 5 | Status: SHIPPED | OUTPATIENT
Start: 2022-07-04

## 2022-07-13 RX ORDER — TRAMADOL HYDROCHLORIDE 50 MG/1
100 TABLET ORAL 3 TIMES DAILY
Qty: 180 TABLET | Refills: 1 | Status: SHIPPED | OUTPATIENT
Start: 2022-07-13

## 2022-07-21 RX ORDER — GABAPENTIN 300 MG/1
300 CAPSULE ORAL 3 TIMES DAILY
Qty: 270 CAPSULE | Refills: 0 | Status: SHIPPED | OUTPATIENT
Start: 2022-07-21 | End: 2023-02-14

## 2022-07-21 RX ORDER — LOTEPREDNOL ETABONATE AND TOBRAMYCIN 5; 3 MG/ML; MG/ML
4 SUSPENSION/ DROPS OPHTHALMIC DAILY
Qty: 10 ML | Refills: 0 | Status: SHIPPED | OUTPATIENT
Start: 2022-07-21 | End: 2022-10-11

## 2022-09-09 NOTE — TELEPHONE ENCOUNTER
Requesting Tramadol 50mg  LOV: 5/4/22 Physical  RTC: 6 months  Last Relevant Labs: 10/26/2020  Filled: 7/13/22 #180 with 1 refills    No future appointments.     Rx pended and routed for approval/denial

## 2022-09-11 RX ORDER — TRAMADOL HYDROCHLORIDE 50 MG/1
100 TABLET ORAL 3 TIMES DAILY
Qty: 180 TABLET | Refills: 1 | Status: SHIPPED | OUTPATIENT
Start: 2022-09-11

## 2022-09-11 RX ORDER — PEN NEEDLE, DIABETIC 32GX 5/32"
NEEDLE, DISPOSABLE MISCELLANEOUS
Qty: 400 EACH | Refills: 2 | Status: SHIPPED | OUTPATIENT
Start: 2022-09-11

## 2022-09-11 RX ORDER — TRAMADOL HYDROCHLORIDE 50 MG/1
100 TABLET ORAL 3 TIMES DAILY
Qty: 180 TABLET | Refills: 1 | OUTPATIENT
Start: 2022-09-11

## 2022-10-10 RX ORDER — TRAMADOL HYDROCHLORIDE 50 MG/1
100 TABLET ORAL 3 TIMES DAILY
Qty: 180 TABLET | Refills: 1 | Status: SHIPPED | OUTPATIENT
Start: 2022-10-10

## 2022-10-15 RX ORDER — LOTEPREDNOL ETABONATE AND TOBRAMYCIN 5; 3 MG/ML; MG/ML
4 SUSPENSION/ DROPS OPHTHALMIC DAILY
Qty: 10 ML | Refills: 0 | Status: SHIPPED | OUTPATIENT
Start: 2022-10-15

## 2022-10-16 RX ORDER — LISINOPRIL 5 MG/1
TABLET ORAL
Qty: 180 TABLET | Refills: 1 | Status: SHIPPED | OUTPATIENT
Start: 2022-10-16

## 2022-11-07 RX ORDER — SIMVASTATIN 20 MG
20 TABLET ORAL NIGHTLY
Qty: 90 TABLET | Refills: 1 | Status: SHIPPED | OUTPATIENT
Start: 2022-11-07

## 2022-11-08 RX ORDER — TRAMADOL HYDROCHLORIDE 50 MG/1
100 TABLET ORAL 3 TIMES DAILY
Qty: 180 TABLET | Refills: 1 | Status: SHIPPED | OUTPATIENT
Start: 2022-11-08

## 2022-11-09 ENCOUNTER — LAB ENCOUNTER (OUTPATIENT)
Dept: LAB | Age: 42
End: 2022-11-09
Attending: FAMILY MEDICINE
Payer: MEDICARE

## 2022-11-09 DIAGNOSIS — Z79.4 TYPE 2 DIABETES MELLITUS WITH DIABETIC NEUROPATHY, WITH LONG-TERM CURRENT USE OF INSULIN (HCC): ICD-10-CM

## 2022-11-09 DIAGNOSIS — E11.40 TYPE 2 DIABETES MELLITUS WITH DIABETIC NEUROPATHY, WITH LONG-TERM CURRENT USE OF INSULIN (HCC): ICD-10-CM

## 2022-11-09 LAB
ALBUMIN SERPL-MCNC: 3.6 G/DL (ref 3.4–5)
ALBUMIN/GLOB SERPL: 1 {RATIO} (ref 1–2)
ALP LIVER SERPL-CCNC: 84 U/L
ALT SERPL-CCNC: 27 U/L
ANION GAP SERPL CALC-SCNC: 5 MMOL/L (ref 0–18)
AST SERPL-CCNC: 17 U/L (ref 15–37)
BASOPHILS # BLD AUTO: 0.02 X10(3) UL (ref 0–0.2)
BASOPHILS NFR BLD AUTO: 0.2 %
BILIRUB SERPL-MCNC: 0.5 MG/DL (ref 0.1–2)
BUN BLD-MCNC: 15 MG/DL (ref 7–18)
CALCIUM BLD-MCNC: 8.9 MG/DL (ref 8.5–10.1)
CHLORIDE SERPL-SCNC: 103 MMOL/L (ref 98–112)
CHOLEST SERPL-MCNC: 172 MG/DL (ref ?–200)
CO2 SERPL-SCNC: 24 MMOL/L (ref 21–32)
CREAT BLD-MCNC: 1.13 MG/DL
EOSINOPHIL # BLD AUTO: 0.13 X10(3) UL (ref 0–0.7)
EOSINOPHIL NFR BLD AUTO: 1.6 %
ERYTHROCYTE [DISTWIDTH] IN BLOOD BY AUTOMATED COUNT: 12.7 %
EST. AVERAGE GLUCOSE BLD GHB EST-MCNC: 189 MG/DL (ref 68–126)
FASTING PATIENT LIPID ANSWER: YES
FASTING STATUS PATIENT QL REPORTED: YES
GFR SERPLBLD BASED ON 1.73 SQ M-ARVRAT: 62 ML/MIN/1.73M2 (ref 60–?)
GLOBULIN PLAS-MCNC: 3.7 G/DL (ref 2.8–4.4)
GLUCOSE BLD-MCNC: 145 MG/DL (ref 70–99)
HBA1C MFR BLD: 8.2 % (ref ?–5.7)
HCT VFR BLD AUTO: 35.9 %
HDLC SERPL-MCNC: 58 MG/DL (ref 40–59)
HGB BLD-MCNC: 11.5 G/DL
IMM GRANULOCYTES # BLD AUTO: 0.03 X10(3) UL (ref 0–1)
IMM GRANULOCYTES NFR BLD: 0.4 %
LDLC SERPL CALC-MCNC: 85 MG/DL (ref ?–100)
LYMPHOCYTES # BLD AUTO: 2.12 X10(3) UL (ref 1–4)
LYMPHOCYTES NFR BLD AUTO: 25.5 %
MCH RBC QN AUTO: 29.1 PG (ref 26–34)
MCHC RBC AUTO-ENTMCNC: 32 G/DL (ref 31–37)
MCV RBC AUTO: 90.9 FL
MONOCYTES # BLD AUTO: 0.65 X10(3) UL (ref 0.1–1)
MONOCYTES NFR BLD AUTO: 7.8 %
NEUTROPHILS # BLD AUTO: 5.37 X10 (3) UL (ref 1.5–7.7)
NEUTROPHILS # BLD AUTO: 5.37 X10(3) UL (ref 1.5–7.7)
NEUTROPHILS NFR BLD AUTO: 64.5 %
NONHDLC SERPL-MCNC: 114 MG/DL (ref ?–130)
OSMOLALITY SERPL CALC.SUM OF ELEC: 277 MOSM/KG (ref 275–295)
PLATELET # BLD AUTO: 291 10(3)UL (ref 150–450)
POTASSIUM SERPL-SCNC: 4.8 MMOL/L (ref 3.5–5.1)
PROT SERPL-MCNC: 7.3 G/DL (ref 6.4–8.2)
RBC # BLD AUTO: 3.95 X10(6)UL
SODIUM SERPL-SCNC: 132 MMOL/L (ref 136–145)
TRIGL SERPL-MCNC: 173 MG/DL (ref 30–149)
TSI SER-ACNC: 1.77 MIU/ML (ref 0.36–3.74)
VLDLC SERPL CALC-MCNC: 28 MG/DL (ref 0–30)
WBC # BLD AUTO: 8.3 X10(3) UL (ref 4–11)

## 2022-11-09 PROCEDURE — 83036 HEMOGLOBIN GLYCOSYLATED A1C: CPT

## 2022-11-09 PROCEDURE — 36415 COLL VENOUS BLD VENIPUNCTURE: CPT

## 2022-11-09 PROCEDURE — 80061 LIPID PANEL: CPT

## 2022-11-09 PROCEDURE — 84443 ASSAY THYROID STIM HORMONE: CPT

## 2022-11-09 PROCEDURE — 80053 COMPREHEN METABOLIC PANEL: CPT

## 2022-11-09 PROCEDURE — 85025 COMPLETE CBC W/AUTO DIFF WBC: CPT

## 2022-11-14 ENCOUNTER — TELEPHONE (OUTPATIENT)
Dept: FAMILY MEDICINE CLINIC | Facility: CLINIC | Age: 42
End: 2022-11-14

## 2022-11-14 NOTE — TELEPHONE ENCOUNTER
Received diabetic eye exam report from [facility] eye [facility]. Pt completed eye exam on 11/8/22. No diabetic retinopathy. Flow sheet updated in patients chart. Report sent to scan.

## 2022-11-19 ENCOUNTER — PATIENT MESSAGE (OUTPATIENT)
Dept: FAMILY MEDICINE CLINIC | Facility: CLINIC | Age: 42
End: 2022-11-19

## 2022-11-21 ENCOUNTER — PATIENT MESSAGE (OUTPATIENT)
Dept: FAMILY MEDICINE CLINIC | Facility: CLINIC | Age: 42
End: 2022-11-21

## 2022-11-21 RX ORDER — INSULIN GLARGINE 100 [IU]/ML
INJECTION, SOLUTION SUBCUTANEOUS
Qty: 15 EACH | Refills: 2 | Status: SHIPPED | OUTPATIENT
Start: 2022-11-21

## 2022-12-08 RX ORDER — TRAMADOL HYDROCHLORIDE 50 MG/1
100 TABLET ORAL 3 TIMES DAILY
Qty: 180 TABLET | Refills: 1 | Status: SHIPPED | OUTPATIENT
Start: 2022-12-08

## 2022-12-15 ENCOUNTER — OFFICE VISIT (OUTPATIENT)
Dept: FAMILY MEDICINE CLINIC | Facility: CLINIC | Age: 42
End: 2022-12-15
Payer: MEDICARE

## 2022-12-15 VITALS
SYSTOLIC BLOOD PRESSURE: 118 MMHG | RESPIRATION RATE: 16 BRPM | TEMPERATURE: 98 F | BODY MASS INDEX: 30.91 KG/M2 | DIASTOLIC BLOOD PRESSURE: 72 MMHG | HEIGHT: 62 IN | OXYGEN SATURATION: 98 % | HEART RATE: 80 BPM | WEIGHT: 168 LBS

## 2022-12-15 DIAGNOSIS — L40.9 PSORIASIS: ICD-10-CM

## 2022-12-15 DIAGNOSIS — Z79.4 TYPE 2 DIABETES MELLITUS WITH DIABETIC NEUROPATHY, WITH LONG-TERM CURRENT USE OF INSULIN (HCC): Primary | ICD-10-CM

## 2022-12-15 DIAGNOSIS — E11.40 TYPE 2 DIABETES MELLITUS WITH DIABETIC NEUROPATHY, WITH LONG-TERM CURRENT USE OF INSULIN (HCC): Primary | ICD-10-CM

## 2022-12-15 PROCEDURE — 99214 OFFICE O/P EST MOD 30 MIN: CPT | Performed by: FAMILY MEDICINE

## 2022-12-15 RX ORDER — PERPHENAZINE 16 MG/1
TABLET, FILM COATED ORAL
Qty: 200 EACH | Refills: 2 | Status: SHIPPED | OUTPATIENT
Start: 2022-12-15

## 2022-12-15 NOTE — PATIENT INSTRUCTIONS
Thank you for choosing Adriano Valentine MD at [place] [address]  To Do: Corrie Loredo  1. Please info  [facility] is located in [address]. Monday, Tuesday & Friday - 8 a.m. to 4 p.m. Wednesday, Thursday - 7 a.m. to 3 p.m. The lab is closed daily from 12 p.m.-12:30 p.m. Saturday lab hours by appointment. Call [phone] to schedule the appointment. Please signup for [employer], which is electronic access to your record if you have not done so. All your results will post on there. [email or web address]   You can NOW use [employer] to book your appointments with us, or consider using open access scheduling which is through the edward website [email or web address] and type in Adriano Valentine MD and follow the links for \"Schedule Online Now\"    To schedule Imaging or tests at [facility] Scheduling [phone], Go to [facility] A ER Building (For example: CT scans, X rays, Ultrasound, MRI)  Cardiac Testing in ER building Building A second floor Cardiac Testing [phone] (For example: Holter Monitor, Cardiac Stress tests,Event Monitor, or 2D Echocardiograms)  [facility] call [phone] usually in [facility] A  Walk in Clinic in [place] at [facility]. [address] Mon-Fri at 8am-7:30 p.m., and Sat/Sun 9:00a. m.-4:30 p.m. Also at [address]  Call [phone] for info     Please call our office about any questions regarding your treatment/medicines/tests as a result of today's visit. For your safety, read the entire package insert of all medicines prescribed to you and be aware of all of the risks of treatment even beyond those discussed today. All therapies have potential risk of harm or side effects or medication interactions.   It is your duty and for your safety to discuss with the pharmacist and our office with questions, and to notify us and stop treatment if problems arise, but know that our intention is that the benefits outweigh those potential risks and we strive to make you healthier and to improve your quality of life. Referrals, and Radiology Information:    If your insurance requires a referral to a specialist, please allow 5 business days to process your referral request.    If Allison Delgado MD orders a CT or MRI, it may take up to 10 business days to receive approval from your insurance company. Once our office has called informing you that the insurance company approved your testing, please call Central Scheduling at [phone]  Please allow our office 5 business days to contact you regarding any testing results. Refill policies:   Allow 3 business days for refills; controlled substances may take longer and must be picked up from the office in person. Narcotic medications can only be filled in 30 day increments and must be refilled at an office visit only. If your prescription is due for a refill, you may be due for a follow-up appointment. We cannot refill your maintenance medications at a preventative wellness visit. To best provide you care, patients receiving maintenance medications need to be seen at least twice a year.

## 2022-12-29 ENCOUNTER — PATIENT MESSAGE (OUTPATIENT)
Dept: FAMILY MEDICINE CLINIC | Facility: CLINIC | Age: 42
End: 2022-12-29

## 2022-12-29 ENCOUNTER — TELEPHONE (OUTPATIENT)
Dept: FAMILY MEDICINE CLINIC | Facility: CLINIC | Age: 42
End: 2022-12-29

## 2022-12-29 RX ORDER — PERPHENAZINE 16 MG/1
TABLET, FILM COATED ORAL
Qty: 200 EACH | Refills: 2 | Status: SHIPPED | OUTPATIENT
Start: 2022-12-29

## 2022-12-30 ENCOUNTER — PATIENT MESSAGE (OUTPATIENT)
Dept: FAMILY MEDICINE CLINIC | Facility: CLINIC | Age: 42
End: 2022-12-30

## 2023-01-02 RX ORDER — PEN NEEDLE, DIABETIC 32GX 5/32"
NEEDLE, DISPOSABLE MISCELLANEOUS
Qty: 400 EACH | Refills: 2 | Status: SHIPPED | OUTPATIENT
Start: 2023-01-02

## 2023-01-04 ENCOUNTER — TELEPHONE (OUTPATIENT)
Dept: FAMILY MEDICINE CLINIC | Facility: CLINIC | Age: 43
End: 2023-01-04

## 2023-01-04 NOTE — TELEPHONE ENCOUNTER
Edel hooper from [facility], [address], [address] [facility], phone [phone] and fax [phone]. Request is for most recent chart notes in regards to diabetic testing supplies (Medicare guidelines). Faxed form and office visit note from 12/15/22 to [phone].

## 2023-01-05 RX ORDER — IBUPROFEN 800 MG/1
TABLET ORAL
Qty: 90 TABLET | Refills: 5 | Status: SHIPPED | OUTPATIENT
Start: 2023-01-05

## 2023-01-06 NOTE — TELEPHONE ENCOUNTER
Requesting Tramadol 50mg  LOV: 12/15/22  RTC: 6 monhs  Last Relevant Labs: 11/9/22  Filled: 12/8/22 #180 with 1 refills    Future Appointments   Date Time Provider Aniyah Katerin   3/30/2023  1:30 PM Venecia Castellon MD [facility] [address]     Rx pended and routed for approval/denial

## 2023-01-07 RX ORDER — TRAMADOL HYDROCHLORIDE 50 MG/1
100 TABLET ORAL 3 TIMES DAILY
Qty: 180 TABLET | Refills: 1 | Status: SHIPPED | OUTPATIENT
Start: 2023-01-07

## 2023-02-14 RX ORDER — GABAPENTIN 300 MG/1
300 CAPSULE ORAL 3 TIMES DAILY
Qty: 270 CAPSULE | Refills: 0 | Status: SHIPPED | OUTPATIENT
Start: 2023-02-14

## 2023-02-14 NOTE — TELEPHONE ENCOUNTER
Requesting Gabapentin 300mg  LOV: 12/15/22  RTC: 6 months  Last Relevant Labs: 11/9/22  Filled: 7/21/22 #270 with 0 refills    Future Appointments   Date Time Provider Aniyah Katerin   3/30/2023  1:30 PM Rika Small MD [facility] [address]     Non-protocol med:  Rx pended and routed for approval/denial

## 2023-02-23 ENCOUNTER — PATIENT MESSAGE (OUTPATIENT)
Dept: FAMILY MEDICINE CLINIC | Facility: CLINIC | Age: 43
End: 2023-02-23

## 2023-02-23 RX ORDER — PERPHENAZINE 16 MG/1
TABLET, FILM COATED ORAL
Qty: 200 EACH | Refills: 2 | Status: SHIPPED | OUTPATIENT
Start: 2023-02-23 | End: 2023-02-27

## 2023-02-23 NOTE — TELEPHONE ENCOUNTER
From: Sinan Domínguez  To: Angie Reyez MD  Sent: 2/23/2023 9:19 AM CST  Subject: Contour next testing strips    Hello Dr. Waldo Meckel I am texting you because I need the contour strip prescription to be changed to testing three times a day as insurance will only pay up to three times a day. Thank you very much.

## 2023-02-24 ENCOUNTER — PATIENT MESSAGE (OUTPATIENT)
Dept: FAMILY MEDICINE CLINIC | Facility: CLINIC | Age: 43
End: 2023-02-24

## 2023-02-26 ENCOUNTER — PATIENT MESSAGE (OUTPATIENT)
Dept: FAMILY MEDICINE CLINIC | Facility: CLINIC | Age: 43
End: 2023-02-26

## 2023-02-27 RX ORDER — PERPHENAZINE 16 MG/1
TABLET, FILM COATED ORAL
Qty: 200 EACH | Refills: 2 | Status: SHIPPED | OUTPATIENT
Start: 2023-02-27

## 2023-03-07 RX ORDER — TRAMADOL HYDROCHLORIDE 50 MG/1
100 TABLET ORAL 3 TIMES DAILY
Qty: 180 TABLET | Refills: 1 | Status: SHIPPED | OUTPATIENT
Start: 2023-03-07

## 2023-03-30 ENCOUNTER — TELEPHONE (OUTPATIENT)
Dept: RHEUMATOLOGY | Facility: CLINIC | Age: 43
End: 2023-03-30

## 2023-03-30 ENCOUNTER — OFFICE VISIT (OUTPATIENT)
Dept: RHEUMATOLOGY | Facility: CLINIC | Age: 43
End: 2023-03-30
Payer: MEDICARE

## 2023-03-30 VITALS
BODY MASS INDEX: 30 KG/M2 | DIASTOLIC BLOOD PRESSURE: 68 MMHG | HEART RATE: 84 BPM | TEMPERATURE: 97 F | OXYGEN SATURATION: 96 % | HEIGHT: 62 IN | WEIGHT: 163 LBS | RESPIRATION RATE: 16 BRPM | SYSTOLIC BLOOD PRESSURE: 116 MMHG

## 2023-03-30 DIAGNOSIS — Q93.88 SMITH-MAGENIS SYNDROME: ICD-10-CM

## 2023-03-30 DIAGNOSIS — E11.40 TYPE 2 DIABETES MELLITUS WITH DIABETIC NEUROPATHY, WITH LONG-TERM CURRENT USE OF INSULIN (HCC): ICD-10-CM

## 2023-03-30 DIAGNOSIS — Z79.4 TYPE 2 DIABETES MELLITUS WITH DIABETIC NEUROPATHY, WITH LONG-TERM CURRENT USE OF INSULIN (HCC): ICD-10-CM

## 2023-03-30 DIAGNOSIS — Z51.81 THERAPEUTIC DRUG MONITORING: ICD-10-CM

## 2023-03-30 DIAGNOSIS — L40.50 PSORIATIC ARTHRITIS (HCC): ICD-10-CM

## 2023-03-30 DIAGNOSIS — L40.9 PSORIASIS: Primary | ICD-10-CM

## 2023-03-30 PROCEDURE — 99215 OFFICE O/P EST HI 40 MIN: CPT | Performed by: INTERNAL MEDICINE

## 2023-03-30 NOTE — TELEPHONE ENCOUNTER
PA for Cosentyx 300mg pen started with [facility]    Sample given for load per Dr. Rafi Steinberg. Pt to take 300mg week 0, then 300mg week 2 then pt should begin maintenance 300mg every 4 wks. Sample of 2 150mg pen(300mg)  X2 given in office today. Mother declines teaching and dosing and frequency reviewed. Voiced understanding.      Lot [id number]  Exp 7/23

## 2023-03-30 NOTE — PATIENT INSTRUCTIONS
Cosentyx 300 mg: today, then 2 weeks later, then 2 weeks later. Then it will be every month. Dry eyes, try OTC eye drops: Systane, Refresh or Blink. Preservative-free (individually packaged in a box) are the best as preservatives can dry the eyes out.

## 2023-04-06 RX ORDER — TRAMADOL HYDROCHLORIDE 50 MG/1
100 TABLET ORAL 3 TIMES DAILY
Qty: 180 TABLET | Refills: 1 | Status: CANCELLED | OUTPATIENT
Start: 2023-04-06

## 2023-04-07 ENCOUNTER — PATIENT MESSAGE (OUTPATIENT)
Dept: FAMILY MEDICINE CLINIC | Facility: CLINIC | Age: 43
End: 2023-04-07

## 2023-04-07 RX ORDER — TRAMADOL HYDROCHLORIDE 50 MG/1
100 TABLET ORAL 3 TIMES DAILY
Qty: 180 TABLET | Refills: 1 | OUTPATIENT
Start: 2023-04-07

## 2023-04-30 RX ORDER — LISINOPRIL 5 MG/1
TABLET ORAL
Qty: 180 TABLET | Refills: 1 | Status: SHIPPED | OUTPATIENT
Start: 2023-04-30

## 2023-05-03 RX ORDER — SIMVASTATIN 20 MG
20 TABLET ORAL NIGHTLY
Qty: 90 TABLET | Refills: 1 | Status: SHIPPED | OUTPATIENT
Start: 2023-05-03

## 2023-05-05 ENCOUNTER — TELEPHONE (OUTPATIENT)
Dept: RHEUMATOLOGY | Facility: CLINIC | Age: 43
End: 2023-05-05

## 2023-05-05 NOTE — TELEPHONE ENCOUNTER
Fax from [facility], have been trying to reach pt to set up delivery of Cosentyx. Have made multiple attempts. Called pt mother, explained [facility] trying to set up delivery and not able to reach pt. Number given to [facility]. Voices understanding.

## 2023-05-07 RX ORDER — TRAMADOL HYDROCHLORIDE 50 MG/1
100 TABLET ORAL 3 TIMES DAILY
Qty: 180 TABLET | Refills: 1 | Status: SHIPPED | OUTPATIENT
Start: 2023-05-07

## 2023-05-12 RX ORDER — GABAPENTIN 300 MG/1
CAPSULE ORAL
Qty: 270 CAPSULE | Refills: 0 | Status: SHIPPED | OUTPATIENT
Start: 2023-05-12

## 2023-05-12 NOTE — TELEPHONE ENCOUNTER
Requesting Gabapentin 300mg  LOV: 12/15/22  RTC: 6 months  Last Relevant Labs: 11/9/22  Filled: 2/14/23 #270 with 0 refills    Future Appointments   Date Time Provider Aniyah Conklin   8/28/2023 10:30 AM Dago Dawn MD [facility]     Non-protocol med:  Rx pended and routed for approval/denial

## 2023-06-02 ENCOUNTER — TELEPHONE (OUTPATIENT)
Dept: RHEUMATOLOGY | Facility: CLINIC | Age: 43
End: 2023-06-02

## 2023-06-02 NOTE — TELEPHONE ENCOUNTER
Pt mother called office, pt taking Cosentyx and has noticed pt is havingunexplained diarrhea, even while sleeping. Pt also co discomfort while wearing a seat belt. Pt mom is worried pt may have UC or Chron's, since she has a hx of as well. Declines speaking to PCP, and would like Dr. Torrey Diane to be aware since he addressed this may be a side effect from the medication. Would like to know if they should medication to Enbrel?

## 2023-06-05 NOTE — TELEPHONE ENCOUNTER
6/2/2023 addendum.  -Per mom, patient with worsening diarrhea. Mom has Crohn's. Possible aggravation of underlying IBD from IL-17 inhibition which is a known side effect. We will hold off on next dose of Cosentyx as scheduled on June 9. We will switch to Humira which is effective for psoriasis, psoriatic arthritis and Crohn's disease. Please call patient to give samples and start on PA for psoriatic arthritis. Failed/did not tolerate cosentyx/enbrel in the past. See last note.

## 2023-06-08 ENCOUNTER — NURSE ONLY (OUTPATIENT)
Dept: RHEUMATOLOGY | Facility: CLINIC | Age: 43
End: 2023-06-08
Payer: MEDICARE

## 2023-07-01 RX ORDER — LOTEPREDNOL ETABONATE AND TOBRAMYCIN 5; 3 MG/ML; MG/ML
4 SUSPENSION/ DROPS OPHTHALMIC DAILY
Qty: 10 ML | Refills: 0 | Status: SHIPPED | OUTPATIENT
Start: 2023-07-01

## 2023-07-03 RX ORDER — IBUPROFEN 800 MG/1
TABLET ORAL
Qty: 90 TABLET | Refills: 5 | Status: SHIPPED | OUTPATIENT
Start: 2023-07-03

## 2023-07-06 RX ORDER — TRAMADOL HYDROCHLORIDE 50 MG/1
100 TABLET ORAL 3 TIMES DAILY
Qty: 180 TABLET | Refills: 1 | Status: SHIPPED | OUTPATIENT
Start: 2023-07-06

## 2023-07-12 ENCOUNTER — TELEPHONE (OUTPATIENT)
Dept: FAMILY MEDICINE CLINIC | Facility: CLINIC | Age: 43
End: 2023-07-12

## 2023-07-12 NOTE — TELEPHONE ENCOUNTER
[facility]  PROGRESS NOTE    Patient: Av Diaz Date of Service: 4/21/2019   YOB: 1951 Admission Date: 4/12/2019   MRN: [id number] Attending: Madalyn Portillo MD   PCP: Ferny Cole MD   Hospital Day: Hospital Day: 10     PRIMARY CARDIOLOGIST: Dr Portillo    SUMMARY OF HOSPITALIZATION:    66 yo male that has PMHx significant for DM type II, HTN, HLD, and prostate carcinoma. He has been having chest pain for two days.pressure and heaviness in nature. Retrosternal, no radiation.Associated with SOB and dizziness.     Cardiac cath showed multivessel disease, culprit LAD PCI       INTERVAL EVENTS:  . Delirious and agitated overnight on haldol prn  . Denies chest pain, lightheadedness, or palpitations   . Started on OAC 4/19   . Sinus tach without further arrhythmia     RHYTHM / TELEMETRY EVENTS:  Sinus tachycardia     CARDIAC STUDIES:   EKG:   Date: 4/12/2019      ECHO:   Date: 4/13/2019  Severe LV systolic dysfunction. EF approx 20% GLS -5.1% The basal infero-lateral/lateral segments are best preserved. The rest of the walls are severely hypokinetic. The apex is dyskinetic. Hypertrabeculated LV apex/. Diastolic dysfunction with elevated left sided filling pressures.  Severely decreased RV radial systolic function with preserved at least basal longitudinal function. Normal right ventricular size.  Unable to visualize comisure between left and noncoronary cusps. No significant thickening or calcification seen. No aortic valve  regurgitation.  PASP 40 mmHg.    CARDIAC CATH AND INTERVENTIONS:   Date: 4/12/2019  Key Findings/Plan        · Prox LAD lesion with 100% stenosis.  · Prox RCA to Dist RCA lesion with 100% stenosis.  · Mid LM to Dist LM lesion with 50% stenosis.  · Prox Cx to Mid Cx lesion with 80% stenosis.  · Ost 1st Diag lesion with 90% stenosis.     Successful PCI of a totaled LAD  Multivessel CAD         CURRENT MEDICATIONS:   Scheduled:  • furosemide  40 mg Intravenous Daily   •  Was calling the patient to schedule Medicare AWV and her mailbox is full. QUEtiapine  50 mg OG Tube TID   • apixaban  5 mg Oral 2 times per day   • metoPROLOL tartrate  100 mg OG Tube 2 times per day   • insulin glargine  24 Units Subcutaneous Daily   • atorvastatin  80 mg OG Tube Nightly   • famotidine  20 mg OG Tube 2 times per day   • insulin regular (human)   Subcutaneous 4 times per day   • cholecalciferol  1,000 Units OG Tube Daily   • clopidogrel  75 mg OG Tube Daily   • folic acid  1 mg OG Tube Daily   • thiamine  100 mg OG Tube Daily   • albuterol-ipratropium 2.5 mg/0.5 mg  3 mL Nebulization Q6H Resp       Infusions:  • dextrose 5 % infusion     • sodium chloride 0.9% infusion 10 mL/hr at 04/20/19 1630   • sodium chloride 0.9% infusion Stopped (04/20/19 1230)       PRN:  haloperidol, acetaminophen, sodium chloride (PF), sodium chloride, dextrose, dextrose, glucagon, dextrose, ondansetron, sodium chloride, sodium chloride, sodium chloride, potassium chloride, potassium chloride, potassium chloride 20 mEq/100mL IVPB, potassium chloride, potassium chloride, potassium chloride 20 mEq/100mL IVPB, magnesium sulfate, magnesium sulfate, magnesium sulfate    PHYSICAL EXAM:   Blood pressure 125/69, pulse 113, temperature 98.3 °F (36.8 °C), temperature source Axillary, resp. rate 25, height 5' 10\" (1.778 m), weight 74 kg, SpO2 91 %.    Intake/Output Summary (Last 24 hours) at 4/21/2019 0741  Last data filed at 4/21/2019 0535  Gross per 24 hour   Intake 7065.5 ml   Output 7455 ml   Net -389.5 ml     General Appearance: sedated and intubated.  Neck: Spontaneous full range of motion   Eyes: pupils reacting to light, conjunctival pallor  Mouth: ET Tube,  Cardiovascular: S1, 2, RRR  Lungs: b/l equal air entry, mild basilar crackles.  Abdomen: Soft, non-tender, non-distended.  Extremities:  Extremities normal, atraumatic, no cyanosis or edema  Skin: Warm and dry  MSK: No visual signs of joint inflammation.     LABS:     CARDIAC MARKERS: No results found    BNP: No results found    CBC:   Recent  Labs   Lab 04/21/19  0340 04/20/19  0303 04/19/19  0306   WBC 17.1* 17.0* 13.2*   HGB 12.0* 12.4* 12.2*   HCT 37.8* 37.7* 37.2*    250 204       CMP:  Recent Labs   Lab 04/21/19  0340 04/20/19  0303 04/19/19  1446 04/19/19  0900 04/19/19  0306  04/18/19  0322  04/17/19  0330   SODIUM  --  146*  --   --  145  --  148*  --  147*   POTASSIUM  --  4.0 4.2 3.9 3.8   < > 3.7   < > 3.8   CHLORIDE  --  112*  --   --  109*  --  115*  --  117*   CO2  --  29  --   --  30  --  26  --  26   BUN  --  27*  --   --  24*  --  20  --  26*   CREATININE  --  1.22*  --   --  1.34*  --  1.17  --  1.05   GLUCOSE  --  208*  --   --  133*  --  89  --  77   ALBUMIN  --   --   --   --  2.3*  --  2.5*  --  2.1*   PHOS  --   --   --   --  3.3  --  3.5  --  3.5   GPT  --   --   --   --  39  --  43  --  43   ALKPT  --   --   --   --  84  --  90  --  75   BILIRUBIN  --   --   --   --  0.6  --  0.4  --  0.3   MG 2.4 1.8  --   --  2.0  --  1.8   < > 2.0    < > = values in this interval not displayed.       LIPID PANEL:   Recent Labs   Lab 04/20/19  0303   CHOLESTEROL 132   TRIGLYCERIDE 88   HDL 53   CALCLDL 61       HbA1c:   Hemoglobin A1C (%)   Date Value   04/13/2019 12.7 (H)       COAGULATION STUDIES:   Recent Labs   Lab 04/16/19  0415 04/15/19  0511   PT 11.0  --    PTT  --  33*   INR 1.0  --      ASSESSMENT/PROBLEM LIST:   Av Diaz is a 67 year old male with h/o HTN, Dyslipidemia, DM 2 requiring insulin, Active cigg smoker, Heavy alcohol drinker, High grade low volume prostate cancer presented with Ant wall STEMI s/p PCI with SARATH to 100 occluded LAD    . Extubated 04/17/19  . Off pressors   . Anterior wall STEMI s/p cardiac cath showing multivessel disease s/p PCI and SARATH to 100% occluded LAD  . Cardiogenic shock (resolved) with severe ischemic cardiomyopathy; heavy ETOH likely contributing   . Scattered acute ischemic CVAs, bilateral hemisphere; likely cardioembolic  . Dm 2, on insulin lantus and SSc. Endo on board.  . Sinus  tachycardia; physiologic     PLAN:     . On Creg 6.25 mg bid  . On DAPT + APIXIBAN, ASA d/heather   - Continue with Plavix and OAC + lipitor 40     - Stable for transfer to general medical floor     Patient to be seen and discussed with Dr. Bermudez who may augment the plan.     Jostin Land MD  Internal Medicine, PGY-1  [phone]

## 2023-07-20 ENCOUNTER — TELEPHONE (OUTPATIENT)
Dept: FAMILY MEDICINE CLINIC | Facility: CLINIC | Age: 43
End: 2023-07-20

## 2023-07-20 DIAGNOSIS — E78.5 HYPERLIPIDEMIA, UNSPECIFIED HYPERLIPIDEMIA TYPE: ICD-10-CM

## 2023-07-20 DIAGNOSIS — Z79.4 TYPE 2 DIABETES MELLITUS WITH DIABETIC NEUROPATHY, WITH LONG-TERM CURRENT USE OF INSULIN (HCC): Primary | ICD-10-CM

## 2023-07-20 DIAGNOSIS — E11.40 TYPE 2 DIABETES MELLITUS WITH DIABETIC NEUROPATHY, WITH LONG-TERM CURRENT USE OF INSULIN (HCC): Primary | ICD-10-CM

## 2023-07-20 NOTE — TELEPHONE ENCOUNTER
Future Appointments   Date Time Provider Aniyah Conklin   8/28/2023 10:30 AM Rika Small MD [facility] [place]       Received fax from [employer] that are requesting office notes and face to face from within the year. Patient was last in office 12/15/2022 for diabetic management. Will fax over note from 12/15/2022. Patient is overdue for a diabetic management appt with . labs ordered. Please call patient to schedule an appt and to have labs completed prior to appt.     Thank you!!!!

## 2023-07-20 NOTE — TELEPHONE ENCOUNTER
Future Appointments   Date Time Provider Aniyah Conklin   8/17/2023  1:30 PM Ayaan Bashir MD [facility] [address] [facility] [address]   8/28/2023 10:30 AM Alber Carrasco MD EM[facility]  [place]

## 2023-08-04 ENCOUNTER — TELEPHONE (OUTPATIENT)
Dept: RHEUMATOLOGY | Facility: CLINIC | Age: 43
End: 2023-08-04

## 2023-08-04 NOTE — TELEPHONE ENCOUNTER
[facility] Fax stating patient has not refilled their medication    Humira 40mg/ 0.4mL 2pk    They are unable to reach the patient. Patient called to be notified. Spoke to mother.   - number given: [phone]. She will call today.

## 2023-08-12 ENCOUNTER — LAB ENCOUNTER (OUTPATIENT)
Dept: LAB | Age: 43
End: 2023-08-12
Attending: INTERNAL MEDICINE
Payer: MEDICARE

## 2023-08-12 DIAGNOSIS — D64.9 ANEMIA, UNSPECIFIED TYPE: ICD-10-CM

## 2023-08-12 DIAGNOSIS — E78.5 HYPERLIPIDEMIA, UNSPECIFIED HYPERLIPIDEMIA TYPE: ICD-10-CM

## 2023-08-12 DIAGNOSIS — Z51.81 THERAPEUTIC DRUG MONITORING: ICD-10-CM

## 2023-08-12 DIAGNOSIS — L40.50 PSORIATIC ARTHRITIS (HCC): ICD-10-CM

## 2023-08-12 DIAGNOSIS — Z79.4 TYPE 2 DIABETES MELLITUS WITH DIABETIC NEUROPATHY, WITH LONG-TERM CURRENT USE OF INSULIN (HCC): ICD-10-CM

## 2023-08-12 DIAGNOSIS — L40.9 PSORIASIS: ICD-10-CM

## 2023-08-12 DIAGNOSIS — E11.40 TYPE 2 DIABETES MELLITUS WITH DIABETIC NEUROPATHY, WITH LONG-TERM CURRENT USE OF INSULIN (HCC): ICD-10-CM

## 2023-08-12 LAB
ALBUMIN SERPL-MCNC: 3 G/DL (ref 3.4–5)
ALBUMIN/GLOB SERPL: 0.7 {RATIO} (ref 1–2)
ALP LIVER SERPL-CCNC: 91 U/L
ALT SERPL-CCNC: 20 U/L
ANION GAP SERPL CALC-SCNC: 3 MMOL/L (ref 0–18)
AST SERPL-CCNC: 16 U/L (ref 15–37)
BASOPHILS # BLD AUTO: 0.03 X10(3) UL (ref 0–0.2)
BASOPHILS NFR BLD AUTO: 0.3 %
BILIRUB SERPL-MCNC: 0.2 MG/DL (ref 0.1–2)
BUN BLD-MCNC: 16 MG/DL (ref 7–18)
CALCIUM BLD-MCNC: 8.5 MG/DL (ref 8.5–10.1)
CHLORIDE SERPL-SCNC: 111 MMOL/L (ref 98–112)
CHOLEST SERPL-MCNC: 213 MG/DL (ref ?–200)
CO2 SERPL-SCNC: 23 MMOL/L (ref 21–32)
CREAT BLD-MCNC: 0.91 MG/DL
CRP SERPL-MCNC: 0.8 MG/DL (ref ?–0.3)
EGFRCR SERPLBLD CKD-EPI 2021: 80 ML/MIN/1.73M2 (ref 60–?)
EOSINOPHIL # BLD AUTO: 0.04 X10(3) UL (ref 0–0.7)
EOSINOPHIL NFR BLD AUTO: 0.4 %
ERYTHROCYTE [DISTWIDTH] IN BLOOD BY AUTOMATED COUNT: 14.4 %
ERYTHROCYTE [SEDIMENTATION RATE] IN BLOOD: 25 MM/HR
EST. AVERAGE GLUCOSE BLD GHB EST-MCNC: 169 MG/DL (ref 68–126)
FASTING PATIENT LIPID ANSWER: YES
FASTING STATUS PATIENT QL REPORTED: YES
GLOBULIN PLAS-MCNC: 4.1 G/DL (ref 2.8–4.4)
GLUCOSE BLD-MCNC: 120 MG/DL (ref 70–99)
HBA1C MFR BLD: 7.5 % (ref ?–5.7)
HCT VFR BLD AUTO: 29.3 %
HDLC SERPL-MCNC: 63 MG/DL (ref 40–59)
HGB BLD-MCNC: 9.4 G/DL
IMM GRANULOCYTES # BLD AUTO: 0.08 X10(3) UL (ref 0–1)
IMM GRANULOCYTES NFR BLD: 0.8 %
LDLC SERPL CALC-MCNC: 118 MG/DL (ref ?–100)
LYMPHOCYTES # BLD AUTO: 2.12 X10(3) UL (ref 1–4)
LYMPHOCYTES NFR BLD AUTO: 22.5 %
MCH RBC QN AUTO: 26.7 PG (ref 26–34)
MCHC RBC AUTO-ENTMCNC: 32.1 G/DL (ref 31–37)
MCV RBC AUTO: 83.2 FL
MONOCYTES # BLD AUTO: 0.72 X10(3) UL (ref 0.1–1)
MONOCYTES NFR BLD AUTO: 7.6 %
NEUTROPHILS # BLD AUTO: 6.44 X10 (3) UL (ref 1.5–7.7)
NEUTROPHILS # BLD AUTO: 6.44 X10(3) UL (ref 1.5–7.7)
NEUTROPHILS NFR BLD AUTO: 68.4 %
NONHDLC SERPL-MCNC: 150 MG/DL (ref ?–130)
OSMOLALITY SERPL CALC.SUM OF ELEC: 286 MOSM/KG (ref 275–295)
PLATELET # BLD AUTO: 321 10(3)UL (ref 150–450)
POTASSIUM SERPL-SCNC: 3.8 MMOL/L (ref 3.5–5.1)
PROT SERPL-MCNC: 7.1 G/DL (ref 6.4–8.2)
RBC # BLD AUTO: 3.52 X10(6)UL
SODIUM SERPL-SCNC: 137 MMOL/L (ref 136–145)
TRIGL SERPL-MCNC: 183 MG/DL (ref 30–149)
VLDLC SERPL CALC-MCNC: 32 MG/DL (ref 0–30)
WBC # BLD AUTO: 9.4 X10(3) UL (ref 4–11)

## 2023-08-12 PROCEDURE — 86140 C-REACTIVE PROTEIN: CPT

## 2023-08-12 PROCEDURE — 85652 RBC SED RATE AUTOMATED: CPT

## 2023-08-12 PROCEDURE — 80061 LIPID PANEL: CPT

## 2023-08-12 PROCEDURE — 80053 COMPREHEN METABOLIC PANEL: CPT

## 2023-08-12 PROCEDURE — 83550 IRON BINDING TEST: CPT

## 2023-08-12 PROCEDURE — 83540 ASSAY OF IRON: CPT

## 2023-08-12 PROCEDURE — 36415 COLL VENOUS BLD VENIPUNCTURE: CPT

## 2023-08-12 PROCEDURE — 85025 COMPLETE CBC W/AUTO DIFF WBC: CPT

## 2023-08-12 PROCEDURE — 82728 ASSAY OF FERRITIN: CPT

## 2023-08-12 PROCEDURE — 83036 HEMOGLOBIN GLYCOSYLATED A1C: CPT

## 2023-08-12 PROCEDURE — 84443 ASSAY THYROID STIM HORMONE: CPT

## 2023-08-14 ENCOUNTER — TELEPHONE (OUTPATIENT)
Dept: RHEUMATOLOGY | Facility: CLINIC | Age: 43
End: 2023-08-14

## 2023-08-14 DIAGNOSIS — Z51.81 THERAPEUTIC DRUG MONITORING: ICD-10-CM

## 2023-08-14 DIAGNOSIS — D64.9 ANEMIA, UNSPECIFIED TYPE: Primary | ICD-10-CM

## 2023-08-14 LAB
DEPRECATED HBV CORE AB SER IA-ACNC: 3.2 NG/ML
IRON SATN MFR SERPL: 7 %
IRON SERPL-MCNC: 34 UG/DL
TIBC SERPL-MCNC: 454 UG/DL (ref 240–450)
TRANSFERRIN SERPL-MCNC: 305 MG/DL (ref 200–360)
TSI SER-ACNC: 1.92 MIU/ML (ref 0.36–3.74)

## 2023-08-17 ENCOUNTER — OFFICE VISIT (OUTPATIENT)
Dept: FAMILY MEDICINE CLINIC | Facility: CLINIC | Age: 43
End: 2023-08-17
Payer: MEDICARE

## 2023-08-17 VITALS
TEMPERATURE: 98 F | RESPIRATION RATE: 16 BRPM | HEIGHT: 62 IN | BODY MASS INDEX: 29.63 KG/M2 | HEART RATE: 80 BPM | OXYGEN SATURATION: 98 % | WEIGHT: 161 LBS | DIASTOLIC BLOOD PRESSURE: 82 MMHG | SYSTOLIC BLOOD PRESSURE: 118 MMHG

## 2023-08-17 DIAGNOSIS — L40.50 PSORIATIC ARTHRITIS (HCC): ICD-10-CM

## 2023-08-17 DIAGNOSIS — Q93.88 SMITH-MAGENIS SYNDROME: ICD-10-CM

## 2023-08-17 DIAGNOSIS — E11.40 TYPE 2 DIABETES MELLITUS WITH DIABETIC NEUROPATHY, WITH LONG-TERM CURRENT USE OF INSULIN (HCC): ICD-10-CM

## 2023-08-17 DIAGNOSIS — Z12.31 ENCOUNTER FOR SCREENING MAMMOGRAM FOR MALIGNANT NEOPLASM OF BREAST: ICD-10-CM

## 2023-08-17 DIAGNOSIS — Z00.00 ENCOUNTER FOR ANNUAL WELLNESS EXAM IN MEDICARE PATIENT: Primary | ICD-10-CM

## 2023-08-17 DIAGNOSIS — Z00.00 ENCOUNTER FOR ANNUAL HEALTH EXAMINATION: ICD-10-CM

## 2023-08-17 DIAGNOSIS — Z79.4 TYPE 2 DIABETES MELLITUS WITH DIABETIC NEUROPATHY, WITH LONG-TERM CURRENT USE OF INSULIN (HCC): ICD-10-CM

## 2023-08-17 PROCEDURE — G0439 PPPS, SUBSEQ VISIT: HCPCS | Performed by: FAMILY MEDICINE

## 2023-08-17 RX ORDER — METHYLPREDNISOLONE 4 MG/1
TABLET ORAL
Qty: 1 EACH | Refills: 3 | Status: SHIPPED | OUTPATIENT
Start: 2023-08-17

## 2023-08-18 ENCOUNTER — TELEPHONE (OUTPATIENT)
Dept: FAMILY MEDICINE CLINIC | Facility: CLINIC | Age: 43
End: 2023-08-18

## 2023-08-18 NOTE — TELEPHONE ENCOUNTER
Recd ppw from [employer], [address] [facility], [address], with phone [phone] and fax [phone].-[id number] order and request for face to face report within the last year. Faxed completed order, that was signed by Dr. Navneet Leung, and office visit note from 8/17/23 to [phone]. Copy sent to scan and copy placed in blue accordion folder at .

## 2023-08-27 RX ORDER — GABAPENTIN 300 MG/1
300 CAPSULE ORAL 3 TIMES DAILY
Qty: 270 CAPSULE | Refills: 0 | Status: SHIPPED | OUTPATIENT
Start: 2023-08-27

## 2023-08-27 RX ORDER — PERPHENAZINE 16 MG/1
TABLET, FILM COATED ORAL
Qty: 300 STRIP | Refills: 1 | Status: SHIPPED | OUTPATIENT
Start: 2023-08-27

## 2023-08-28 ENCOUNTER — OFFICE VISIT (OUTPATIENT)
Dept: RHEUMATOLOGY | Facility: CLINIC | Age: 43
End: 2023-08-28
Payer: MEDICARE

## 2023-08-28 VITALS
RESPIRATION RATE: 16 BRPM | BODY MASS INDEX: 28.91 KG/M2 | HEART RATE: 102 BPM | SYSTOLIC BLOOD PRESSURE: 162 MMHG | DIASTOLIC BLOOD PRESSURE: 78 MMHG | TEMPERATURE: 98 F | HEIGHT: 62 IN | OXYGEN SATURATION: 97 % | WEIGHT: 157.13 LBS

## 2023-08-28 DIAGNOSIS — Z51.81 THERAPEUTIC DRUG MONITORING: ICD-10-CM

## 2023-08-28 DIAGNOSIS — L40.50 PSORIATIC ARTHRITIS (HCC): Primary | ICD-10-CM

## 2023-08-28 DIAGNOSIS — L40.9 PSORIASIS: ICD-10-CM

## 2023-08-28 DIAGNOSIS — R19.7 DIARRHEA, UNSPECIFIED TYPE: ICD-10-CM

## 2023-08-28 DIAGNOSIS — Q93.88 SMITH-MAGENIS SYNDROME: ICD-10-CM

## 2023-08-28 PROCEDURE — 99214 OFFICE O/P EST MOD 30 MIN: CPT | Performed by: INTERNAL MEDICINE

## 2023-08-28 RX ORDER — ADALIMUMAB 40MG/0.4ML
KIT SUBCUTANEOUS
COMMUNITY
Start: 2023-08-26 | End: 2023-08-28

## 2023-08-28 RX ORDER — TRIAMCINOLONE ACETONIDE 1 MG/G
CREAM TOPICAL 2 TIMES DAILY PRN
Qty: 80 G | Refills: 2 | Status: SHIPPED | OUTPATIENT
Start: 2023-08-28

## 2023-08-28 RX ORDER — METHOTREXATE 2.5 MG/1
TABLET ORAL
Qty: 44 TABLET | Refills: 0 | Status: SHIPPED | OUTPATIENT
Start: 2023-08-28 | End: 2023-10-16

## 2023-08-28 RX ORDER — FOLIC ACID 1 MG/1
3 TABLET ORAL DAILY
Qty: 270 TABLET | Refills: 3 | Status: SHIPPED | OUTPATIENT
Start: 2023-08-28

## 2023-08-28 NOTE — PATIENT INSTRUCTIONS
-Start methotrexate 10 mg (4 tablets) once a week for 2 weeks, then increase to 15 mg (6 tablets) weekly for 2 weeks, then increase to 20 mg (8 tablets) weekly thereafter. Repeat monitoring blood work 4 to 6 weeks after starting methotrexate. This blood work includes two orders: a comprehensive metabolic panel (CMP) and a complete blood count with differential (CBC w/ diff) . These orders are in the system. -Take folic acid 3 mg daily.     -if needed can provide a prednisone 15 mg taper over 4-5 weeks. See gastroenterologist given the iron deficiency and diarrhea.    ==============================================================================================================      Some tips and information when starting on methotrexate:     -Make sure to only take the pills once a week. Do not take the pills every day, which can increase risk of side effects.   -When you first start taking methotrexate, try taking it on a day you are not too busy, in the event you develop any side effects.   -Some find taking methotrexate at night helps them \"sleep off\" any nausea that sometimes can occur.   -If you find you have fatigue the day or two after taking methotrexate, try drinking an extra cup (or two) of coffee on those days. Methotrexate can briefly act as an \"anti-caffeine\"; this effect is usually only on the day that you take methotrexate.   -Make sure to take folic acid every day to prevent side effects. -If you have nausea with methotrexate, you can split up the weekly dose as long it is within a 24-30 hour window. So if you were taking 4 pills of methotrexate every Saturday evening, you could split the dose to 2 pills Saturday morning and 2 pills Saturday evening.  Alternatively, you could also take 2 pills Saturday evening and 2 pills Sunday evening.     -If you take an antibiotic for an infection, make sure to stop methotrexate until you are off the antibiotic.  -If you have an upcoming surgery, let me know. For most orthopedic surgeries such as knee/hip replacements, methotrexate may be continued. For other surgeries, we will decide on a case-by-case basis.   -Don't drink more than 2-3 alcoholic beverages per week while on methotrexate.   -If planning on becoming pregnant, make sure to let me know as it can cause birth defects. Methotrexate needs to be stopped 3 months prior to attempting conception. Methotrexate does NOT impact future fertility.   -Low dose methotrexate is the most studied and commonly used rheumatoid arthritis medication. It is very safe if used appropriately. Most studies have not shown a significant increased risk of infection with the medication at low doses for arthritis. It is safe to take methotrexate during the COVID pandemic. It has NOT been shown that taking methotrexate increases risk of claudia COVID or having a higher risk of severe complications from [place]. Therefore, from a practical standpoint, low dose methotrexate would not be considered an immunosuppressive as it does not increase risk  of infection for most people taking it. However, there may be a mild increase in infections if you have many other medical issues in combination such as heart disease, diabetes, etc.      Here is a nice article talking about common misconceptions and \"myths\" about methotrexate that is worth a read.    https://creakyjoints.org/treatment/methotrexate-myths/

## 2023-09-02 RX ORDER — TRAMADOL HYDROCHLORIDE 50 MG/1
100 TABLET ORAL 3 TIMES DAILY
Qty: 180 TABLET | Refills: 1 | Status: SHIPPED | OUTPATIENT
Start: 2023-09-02

## 2023-09-07 RX ORDER — LOTEPREDNOL ETABONATE AND TOBRAMYCIN 5; 3 MG/ML; MG/ML
4 SUSPENSION/ DROPS OPHTHALMIC DAILY
Qty: 10 ML | Refills: 0 | Status: SHIPPED | OUTPATIENT
Start: 2023-09-07

## 2023-09-13 ENCOUNTER — TELEPHONE (OUTPATIENT)
Dept: RHEUMATOLOGY | Facility: CLINIC | Age: 43
End: 2023-09-13

## 2023-09-13 DIAGNOSIS — L40.50 PSORIATIC ARTHRITIS (HCC): Primary | ICD-10-CM

## 2023-09-13 NOTE — TELEPHONE ENCOUNTER
Patient is aching, has diarrhea, stomach ache. Mother will stop giving patient methotrexate due to intolerance. Please advise. LOV:   Future Appointments   Date Time Provider Aniyah Katerin   12/5/2023  1:00 PM Spencer Cantu MD [facility] [place]     Labs: 8/28 active future.

## 2023-09-14 RX ORDER — PEN NEEDLE, DIABETIC 32GX 5/32"
NEEDLE, DISPOSABLE MISCELLANEOUS
Qty: 400 EACH | Refills: 2 | Status: SHIPPED | OUTPATIENT
Start: 2023-09-14

## 2023-09-15 NOTE — TELEPHONE ENCOUNTER
Patients mother is on board with the plan and is requesting auto injection of methotrexate.      Pending rasuvo

## 2023-09-15 NOTE — TELEPHONE ENCOUNTER
Called pt's phone number Wednesday, Thursday, and today. Unable to leave VM. Please mychart msg/call pt back and see if she would like to try subcutaneous methotrexate which bypasses the gut and has fewer gastrointestinal symptoms than oral methotrexate. If they agree, see if they would like to come in for teaching. Otherwise send in subcutaneous methotrexate and supplies.

## 2023-09-21 ENCOUNTER — TELEPHONE (OUTPATIENT)
Dept: RHEUMATOLOGY | Facility: CLINIC | Age: 43
End: 2023-09-21

## 2023-09-21 RX ORDER — PREDNISONE 1 MG/1
1 TABLET ORAL
Qty: 30 TABLET | Refills: 0 | Status: CANCELLED | OUTPATIENT
Start: 2023-09-21

## 2023-09-21 RX ORDER — PREDNISONE 10 MG/1
10 TABLET ORAL DAILY
Qty: 30 TABLET | Refills: 0 | Status: SHIPPED | OUTPATIENT
Start: 2023-09-21 | End: 2023-09-22

## 2023-09-21 RX ORDER — METHOTREXATE 20 MG/.4ML
20 INJECTION, SOLUTION SUBCUTANEOUS WEEKLY
Qty: 1 EACH | Refills: 0 | Status: CANCELLED | OUTPATIENT
Start: 2023-09-21 | End: 2023-09-28

## 2023-09-21 NOTE — TELEPHONE ENCOUNTER
Mother is calling requesting prednisone. Previously on 10mg with another doctor- this works for her. Explained it is not a long term med. Patient unable to walk. \"Quality of life is 3/10. \"   Not sleeping well. Rasuvo pending, prednisone pending for review.

## 2023-09-21 NOTE — TELEPHONE ENCOUNTER
Spoke to pt, explained Dr. Shefali Schafer filled prednisone 10 mg daily to take for the next month and sthen instructed to stop. Explained Rasuvo is only approved for RA diagnosis. Therefore we cannot get this approved for her psoriatic arthritis. Advised she will need to use the methotrexate via vials. Mother is okay with trying this medication, but not sure of treatment. Mother is requesting something to help her sleep. Pt does not want to even go to sleep, due to the increased pain. Pt has tried OTC sleep aides with little relief.

## 2023-09-21 NOTE — TELEPHONE ENCOUNTER
Filled prednisone 10 mg daily to take for the next month and stop. Rasuvo is only approved for RA diagnosis. Therefore we cannot get this approved for her psoriatic arthritis. She will need to use the methotrexate via vials. If they are okay with this please send in 50 mg / 2 mL vials and supplies (syringes, needles, etc). 140

## 2023-09-22 DIAGNOSIS — L40.50 PSORIATIC ARTHRITIS (HCC): ICD-10-CM

## 2023-09-22 RX ORDER — PREDNISONE 10 MG/1
10 TABLET ORAL DAILY
Qty: 30 TABLET | Refills: 0 | Status: SHIPPED | OUTPATIENT
Start: 2023-09-22

## 2023-09-22 NOTE — TELEPHONE ENCOUNTER
Pharmacy changed for prednisone. She is interested in the Enbrel Autoinjector if appropriate. Mother is requesting a different pain medication for her. She has been using Tramadol for seven years and it no longer works. She is requesting medicine to help her sleep as well. She does not have a pain specialist at this time. Please advise.

## 2023-09-25 NOTE — TELEPHONE ENCOUNTER
Called pt's mom. Reviewed side effects, risks and benefits of Enbrel.  -Have patient  samples. Can provide 3 samples  -Start prior authorization for Enbrel  -Patient was on Enbrel before. Could not tolerate p.o. methotrexate. Does not feel comfortable with methotrexate in vials. TNF inhibitors-  The side effects were reviewed in detail and concerns were addressed. The risks of TNF inhibitors (TNFi) which include Humira, Enbrel, Remicade, Simponi, and  Cimzia were explained. These include and are not limited to immunosuppression, increase risk of infections, risk of reactivation of Tuberculosis, hepatitis, fungal infections among other infections, injection site reactions, allergic reactions, new or worsening psoriatic skin lesions, autoimmune syndrome such as lupus or other autoimmune reactions, neurological disease such as demyelinating disease such as multiple sclerosis, new or worsening heart failure, and malignancies/cancer. It is recommended for patients to hold medications prior to surgical procedures and to delay restarting it after the surgical procedures, the length of time can vary. Please discuss with treating Rheumatologist.     It is recommended for patients to hold medication during an infection. The length of time can vary.  Please discuss with treating Rheumatologist    It is recommended for patients to get age appropriate cancer screening and based on symptoms    In certain situaitons, it is recommended for patients to get skin check by dermatologist due to increased risk of skin cancer

## 2023-09-26 NOTE — TELEPHONE ENCOUNTER
See prior TE from 9/13/2023. Will be starting Enbrel. This will help with sleep/pain if psoriatic arthritis is the issue behind her symptoms.

## 2023-09-27 ENCOUNTER — TELEPHONE (OUTPATIENT)
Dept: RHEUMATOLOGY | Facility: CLINIC | Age: 43
End: 2023-09-27

## 2023-09-27 NOTE — TELEPHONE ENCOUNTER
Future Appointments   Date Time Provider Aniyah Katerin   10/2/2023  2:30 PM [facility] [place]   12/5/2023  1:00 PM Marylen Lusher, MD [facility]

## 2023-10-02 ENCOUNTER — NURSE ONLY (OUTPATIENT)
Dept: RHEUMATOLOGY | Facility: CLINIC | Age: 43
End: 2023-10-02
Payer: MEDICARE

## 2023-10-02 DIAGNOSIS — L40.50 PSORIATIC ARTHRITIS (HCC): Primary | ICD-10-CM

## 2023-10-02 NOTE — PROGRESS NOTES
Pt and pt mom here for Enbrel teaching. Reviewed how to administer subcutaneous injections. Aseptic technique, safety and handling. When to hold medication discussed. Pt given 50mg sample subQ to left upper thigh. Pt tolerated without incidence. Asking appropriate questions and voiced understanding. Samplex1 for next week injection given to mother. Questions encouraged and reassurance given. Sample x2   Lot [id number]  exp 3/31/26.

## 2023-10-16 ENCOUNTER — TELEPHONE (OUTPATIENT)
Dept: RHEUMATOLOGY | Facility: CLINIC | Age: 43
End: 2023-10-16

## 2023-10-16 NOTE — TELEPHONE ENCOUNTER
Dr Valladares-- pt given Enbrel teaching and sample on 10/2/2023. Currently only have 1, 50mg single dose sample left in the fridge.

## 2023-10-16 NOTE — TELEPHONE ENCOUNTER
Spoke to [facility] pharmacy, [facility] approved until further notice. They have attempted to call pt mother to set up delivery X3 with not call back.   This Rn phoned pt mom to notify of above, number provided and will call now to set up delivery.

## 2023-10-18 RX ORDER — LOTEPREDNOL ETABONATE AND TOBRAMYCIN 5; 3 MG/ML; MG/ML
4 SUSPENSION/ DROPS OPHTHALMIC DAILY
Qty: 10 ML | Refills: 0 | Status: SHIPPED | OUTPATIENT
Start: 2023-10-18

## 2023-10-18 RX ORDER — IBUPROFEN 800 MG/1
TABLET ORAL
Qty: 90 TABLET | Refills: 5 | Status: SHIPPED | OUTPATIENT
Start: 2023-10-18

## 2023-10-20 ENCOUNTER — PATIENT MESSAGE (OUTPATIENT)
Dept: FAMILY MEDICINE CLINIC | Facility: CLINIC | Age: 43
End: 2023-10-20

## 2023-10-20 NOTE — TELEPHONE ENCOUNTER
From: Mariusz Granger  To: Adonis Dockery  Sent: 10/20/2023 7:52 AM CDT  Subject: Contour testing strips    I had requested a refill but I found them so I do not need a refill. I apologize we just move so everything is everywhere. Thank you.

## 2023-10-21 RX ORDER — PERPHENAZINE 16 MG/1
TABLET, FILM COATED ORAL
Qty: 300 STRIP | Refills: 1 | Status: SHIPPED | OUTPATIENT
Start: 2023-10-21

## 2023-10-29 RX ORDER — TRAMADOL HYDROCHLORIDE 50 MG/1
100 TABLET ORAL 3 TIMES DAILY
Qty: 180 TABLET | Refills: 1 | Status: SHIPPED | OUTPATIENT
Start: 2023-10-29

## 2023-10-29 RX ORDER — SIMVASTATIN 20 MG
20 TABLET ORAL NIGHTLY
Qty: 90 TABLET | Refills: 1 | Status: SHIPPED | OUTPATIENT
Start: 2023-10-29

## 2023-10-29 RX ORDER — LISINOPRIL 5 MG/1
5 TABLET ORAL 2 TIMES DAILY
Qty: 180 TABLET | Refills: 1 | Status: SHIPPED | OUTPATIENT
Start: 2023-10-29

## 2023-11-12 RX ORDER — IBUPROFEN 800 MG/1
TABLET ORAL
Qty: 90 TABLET | Refills: 5 | Status: SHIPPED | OUTPATIENT
Start: 2023-11-12

## 2023-11-26 RX ORDER — GABAPENTIN 300 MG/1
300 CAPSULE ORAL 3 TIMES DAILY
Qty: 270 CAPSULE | Refills: 0 | Status: SHIPPED | OUTPATIENT
Start: 2023-11-26

## 2023-12-10 RX ORDER — IBUPROFEN 800 MG/1
TABLET ORAL
Qty: 90 TABLET | Refills: 5 | Status: SHIPPED | OUTPATIENT
Start: 2023-12-10

## 2023-12-13 RX ORDER — LOTEPREDNOL ETABONATE AND TOBRAMYCIN 5; 3 MG/ML; MG/ML
4 SUSPENSION/ DROPS OPHTHALMIC DAILY
Qty: 10 ML | Refills: 0 | Status: SHIPPED | OUTPATIENT
Start: 2023-12-13

## 2023-12-27 RX ORDER — MEDROXYPROGESTERONE ACETATE 150 MG/ML
1 INJECTION, SUSPENSION INTRAMUSCULAR
Refills: 2 | OUTPATIENT
Start: 2023-12-27

## 2023-12-28 RX ORDER — TRAMADOL HYDROCHLORIDE 50 MG/1
100 TABLET ORAL 3 TIMES DAILY
Qty: 180 TABLET | Refills: 1 | Status: SHIPPED | OUTPATIENT
Start: 2023-12-28

## 2024-01-25 DIAGNOSIS — L40.50 PSORIATIC ARTHRITIS (HCC): Primary | ICD-10-CM

## 2024-01-25 DIAGNOSIS — L40.9 PSORIASIS: ICD-10-CM

## 2024-01-25 RX ORDER — MEDROXYPROGESTERONE ACETATE 150 MG/ML
1 INJECTION, SUSPENSION INTRAMUSCULAR
Qty: 4 EACH | Refills: 3 | Status: SHIPPED | OUTPATIENT
Start: 2024-01-25

## 2024-01-25 NOTE — TELEPHONE ENCOUNTER
LOV:    08/28/2023      Future Appointments   Date Time Provider Department Center   5/23/2024  3:00 PM Tamiko Valladares MD [facility] [address]       LF:  12/20/2023   QTY:  4 each   Refills:  0    LABS:       Component      Latest Ref Rng 8/12/2023   WBC      4.0 - 11.0 x10(3) uL 9.4    RBC      3.80 - 5.30 x10(6)uL 3.52 (L)    Hemoglobin      12.0 - 16.0 g/dL 9.4 (L)    Hematocrit      35.0 - 48.0 % 29.3 (L)    Platelet Count      150.0 - 450.0 10(3)uL 321.0    MCV      80.0 - 100.0 fL 83.2    MCH      26.0 - 34.0 pg 26.7    MCHC      31.0 - 37.0 g/dL 32.1    RDW      % 14.4    Prelim Neutrophil Abs      1.50 - 7.70 x10 (3) uL 6.44    Neutrophils Absolute      1.50 - 7.70 x10(3) uL 6.44    Lymphocytes Absolute      1.00 - 4.00 x10(3) uL 2.12    Monocytes Absolute      0.10 - 1.00 x10(3) uL 0.72    Eosinophils Absolute      0.00 - 0.70 x10(3) uL 0.04    Basophils Absolute      0.00 - 0.20 x10(3) uL 0.03    Immature Granulocyte Absolute      0.00 - 1.00 x10(3) uL 0.08    Neutrophils %      % 68.4    Lymphocytes %      % 22.5    Monocytes %      % 7.6    Eosinophils %      % 0.4    Basophils %      % 0.3    Immature Granulocyte %      % 0.8    Glucose      70 - 99 mg/dL 120 (H)    Sodium      136 - 145 mmol/L 137    Potassium      3.5 - 5.1 mmol/L 3.8    Chloride      98 - 112 mmol/L 111    Carbon Dioxide, Total      21.0 - 32.0 mmol/L 23.0    ANION GAP      0 - 18 mmol/L 3    BUN      7 - 18 mg/dL 16    CREATININE      0.55 - 1.02 mg/dL 0.91    CALCIUM      8.5 - 10.1 mg/dL 8.5    CALCULATED OSMOLALITY      275 - 295 mOsm/kg 286    EGFR      >=60 mL/min/1.73m2 80    AST (SGOT)      15 - 37 U/L 16    ALT (SGPT)      13 - 56 U/L 20    ALKALINE PHOSPHATASE      37 - 98 U/L 91    Total Bilirubin      0.1 - 2.0 mg/dL 0.2    PROTEIN, TOTAL      6.4 - 8.2 g/dL 7.1    Albumin      3.4 - 5.0 g/dL 3.0 (L)    Globulin      2.8 - 4.4 g/dL 4.1    A/G Ratio      1.0 - 2.0  0.7 (L)    Patient Fasting for CMP? Yes        Legend:  (L) Low  (H) High

## 2024-02-02 ENCOUNTER — PATIENT MESSAGE (OUTPATIENT)
Dept: FAMILY MEDICINE CLINIC | Facility: CLINIC | Age: 44
End: 2024-02-02

## 2024-02-02 RX ORDER — INSULIN ASPART 100 [IU]/ML
INJECTION, SOLUTION INTRAVENOUS; SUBCUTANEOUS
Qty: 30 EACH | Refills: 3 | Status: SHIPPED | OUTPATIENT
Start: 2024-02-02

## 2024-02-02 NOTE — TELEPHONE ENCOUNTER
From: Yumi Zamora  To: Nicolas Zamora  Sent: 2/2/2024 8:18 AM CST  Subject: Novologue     hi I am looking for a prescription of NovoLog for Yumi but I don’t see it anywhere and I don’t see it at the pharmacy Portal. Could you please send over a prescription for Yumi for Nova I would appreciate it. Thank you.

## 2024-02-05 ENCOUNTER — PATIENT MESSAGE (OUTPATIENT)
Dept: FAMILY MEDICINE CLINIC | Facility: CLINIC | Age: 44
End: 2024-02-05

## 2024-02-05 ENCOUNTER — TELEPHONE (OUTPATIENT)
Dept: FAMILY MEDICINE CLINIC | Facility: CLINIC | Age: 44
End: 2024-02-05

## 2024-02-05 NOTE — TELEPHONE ENCOUNTER
Received incoming fax from pharmacy on Novolog insulin flexpen, pharmacy needs to verify directions. Needs to know max daily dose for insurance purposes. Will place fax in MA folder

## 2024-02-09 NOTE — TELEPHONE ENCOUNTER
From: Yumi Zamora  To: Nicolas Zamora  Sent: 2/5/2024 10:17 AM CST  Subject: Nova log    hi doctor I am still waiting on a prescription for Novalog [facility] was waiting for clarity on how to dispense Yumi is on a sliding scale. I usually give her about 13 units 2 to 3 times a day. Her sugar has been up lately.

## 2024-02-09 NOTE — TELEPHONE ENCOUNTER
There is no max dose on her med history, pharmacy is wanting a max daily dose of insulin even though she is on a sliding scale.    \"hi doctor I am still waiting on a prescription for Novalog [facility] was waiting for clarity on how to dispense Yumi is on a sliding scale. I usually give her about 13 units 2 to 3 times a day. Her sugar has been up lately. \"    Please resent novolog with max daily dose of insulin on sig, insurance will not process medication without it.    Thanks!

## 2024-02-12 ENCOUNTER — TELEPHONE (OUTPATIENT)
Dept: FAMILY MEDICINE CLINIC | Facility: CLINIC | Age: 44
End: 2024-02-12

## 2024-02-12 RX ORDER — INSULIN ASPART 100 [IU]/ML
INJECTION, SOLUTION INTRAVENOUS; SUBCUTANEOUS
Qty: 30 EACH | Refills: 3 | Status: SHIPPED | OUTPATIENT
Start: 2024-02-12

## 2024-02-12 NOTE — TELEPHONE ENCOUNTER
Patient's mother received a call from [facility] and they want a call re: directions, says they also told her about alternatives so unclear maybe it's not covered?

## 2024-02-13 ENCOUNTER — TELEPHONE (OUTPATIENT)
Dept: FAMILY MEDICINE CLINIC | Facility: CLINIC | Age: 44
End: 2024-02-13

## 2024-02-13 RX ORDER — INSULIN LISPRO 100 [IU]/ML
INJECTION, SOLUTION INTRAVENOUS; SUBCUTANEOUS
Qty: 30 EACH | Refills: 3 | Status: SHIPPED | OUTPATIENT
Start: 2024-02-13

## 2024-02-13 NOTE — TELEPHONE ENCOUNTER
Received a fax from [employer] stating that Novolog is not on formulary medication list under patients pharmacy benefit plan.    They state that formulary alternative is Humalog.    Please advise on medication change.    Thanks!

## 2024-02-14 ENCOUNTER — TELEPHONE (OUTPATIENT)
Dept: FAMILY MEDICINE CLINIC | Facility: CLINIC | Age: 44
End: 2024-02-14

## 2024-02-14 NOTE — TELEPHONE ENCOUNTER
Received incoming fax from pharmacy on Humalog, medication not covered. Prefers Tresiba flextouch, will place fax in MA folder

## 2024-02-15 NOTE — TELEPHONE ENCOUNTER
Completed forms to send to insurance for Novolog.    If medication is still denied then another formulary alternative is tresiba flextouch

## 2024-02-19 RX ORDER — LOTEPREDNOL ETABONATE AND TOBRAMYCIN 5; 3 MG/ML; MG/ML
SUSPENSION/ DROPS OPHTHALMIC
Qty: 10 ML | Refills: 0 | Status: SHIPPED | OUTPATIENT
Start: 2024-02-19

## 2024-02-26 DIAGNOSIS — Q93.88 SMITH-MAGENIS SYNDROME (HCC): Primary | ICD-10-CM

## 2024-02-26 RX ORDER — TRAMADOL HYDROCHLORIDE 50 MG/1
100 TABLET ORAL 3 TIMES DAILY
Qty: 180 TABLET | Refills: 1 | Status: SHIPPED | OUTPATIENT
Start: 2024-02-26

## 2024-02-27 ENCOUNTER — TELEPHONE (OUTPATIENT)
Dept: FAMILY MEDICINE CLINIC | Facility: CLINIC | Age: 44
End: 2024-02-27

## 2024-03-10 RX ORDER — PERPHENAZINE 16 MG/1
TABLET, FILM COATED ORAL
Qty: 300 STRIP | Refills: 1 | Status: SHIPPED | OUTPATIENT
Start: 2024-03-10

## 2024-04-08 RX ORDER — LOTEPREDNOL ETABONATE AND TOBRAMYCIN 5; 3 MG/ML; MG/ML
SUSPENSION/ DROPS OPHTHALMIC
Qty: 10 ML | Refills: 0 | Status: SHIPPED | OUTPATIENT
Start: 2024-04-08

## 2024-04-08 RX ORDER — GABAPENTIN 300 MG/1
300 CAPSULE ORAL 3 TIMES DAILY
Qty: 270 CAPSULE | Refills: 0 | Status: SHIPPED | OUTPATIENT
Start: 2024-04-08

## 2024-04-25 DIAGNOSIS — Q93.59 SMITH-MAGENIS SYNDROME (HCC): ICD-10-CM

## 2024-04-25 RX ORDER — TRAMADOL HYDROCHLORIDE 50 MG/1
100 TABLET ORAL 3 TIMES DAILY
Qty: 180 TABLET | Refills: 1 | Status: SHIPPED | OUTPATIENT
Start: 2024-04-25

## 2024-04-28 RX ORDER — SIMVASTATIN 20 MG
20 TABLET ORAL NIGHTLY
Qty: 90 TABLET | Refills: 1 | Status: SHIPPED | OUTPATIENT
Start: 2024-04-28

## 2024-04-28 RX ORDER — LISINOPRIL 5 MG/1
5 TABLET ORAL 2 TIMES DAILY
Qty: 180 TABLET | Refills: 1 | Status: SHIPPED | OUTPATIENT
Start: 2024-04-28

## 2024-05-15 DIAGNOSIS — L40.50 PSORIATIC ARTHRITIS (HCC): ICD-10-CM

## 2024-05-15 DIAGNOSIS — L40.9 PSORIASIS: ICD-10-CM

## 2024-05-15 RX ORDER — MEDROXYPROGESTERONE ACETATE 150 MG/ML
1 INJECTION, SUSPENSION INTRAMUSCULAR
Qty: 4 EACH | Refills: 3 | Status: SHIPPED | OUTPATIENT
Start: 2024-05-15

## 2024-05-15 NOTE — TELEPHONE ENCOUNTER
Last office visit: 8/28/2023    Next Rheum Apt:5/23/2024 Tamiko Valladares MD    Last fill: 1/25/2024 4 each, 3 refills    Labs:   Lab Results   Component Value Date    CREATSERUM 0.91 08/12/2023    ALKPHO 91 08/12/2023    AST 16 08/12/2023    ALT 20 08/12/2023    BILT 0.2 08/12/2023    TP 7.1 08/12/2023    ALB 3.0 (L) 08/12/2023       Lab Results   Component Value Date    WBC 9.4 08/12/2023    HGB 9.4 (L) 08/12/2023    .0 08/12/2023    NEPRELIM 6.44 08/12/2023    NEPERCENT 68.4 08/12/2023    LYPERCENT 22.5 08/12/2023    NE 6.44 08/12/2023    LYMABS 2.12 08/12/2023

## 2024-05-23 ENCOUNTER — OFFICE VISIT (OUTPATIENT)
Dept: RHEUMATOLOGY | Facility: CLINIC | Age: 44
End: 2024-05-23

## 2024-05-23 VITALS
HEIGHT: 62 IN | HEART RATE: 98 BPM | BODY MASS INDEX: 30.36 KG/M2 | TEMPERATURE: 98 F | RESPIRATION RATE: 16 BRPM | OXYGEN SATURATION: 99 % | DIASTOLIC BLOOD PRESSURE: 74 MMHG | SYSTOLIC BLOOD PRESSURE: 120 MMHG | WEIGHT: 165 LBS

## 2024-05-23 DIAGNOSIS — L40.50 PSORIATIC ARTHRITIS (HCC): Primary | ICD-10-CM

## 2024-05-23 DIAGNOSIS — Z79.899 IMMUNODEFICIENCY DUE TO DRUG THERAPY (HCC): ICD-10-CM

## 2024-05-23 DIAGNOSIS — D84.821 IMMUNODEFICIENCY DUE TO DRUG THERAPY (HCC): ICD-10-CM

## 2024-05-23 DIAGNOSIS — L40.9 PSORIASIS: ICD-10-CM

## 2024-05-23 DIAGNOSIS — Z51.81 THERAPEUTIC DRUG MONITORING: ICD-10-CM

## 2024-05-23 PROCEDURE — G2211 COMPLEX E/M VISIT ADD ON: HCPCS | Performed by: INTERNAL MEDICINE

## 2024-05-23 PROCEDURE — 99214 OFFICE O/P EST MOD 30 MIN: CPT | Performed by: INTERNAL MEDICINE
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
## 2024-05-24 RX ORDER — LOTEPREDNOL ETABONATE AND TOBRAMYCIN 5; 3 MG/ML; MG/ML
SUSPENSION/ DROPS OPHTHALMIC
Qty: 10 ML | Refills: 0 | Status: SHIPPED | OUTPATIENT
Start: 2024-05-24

## 2024-06-25 ENCOUNTER — PATIENT MESSAGE (OUTPATIENT)
Dept: FAMILY MEDICINE CLINIC | Facility: CLINIC | Age: 44
End: 2024-06-25

## 2024-06-25 DIAGNOSIS — Q93.59 SMITH-MAGENIS SYNDROME (HCC): ICD-10-CM

## 2024-06-25 RX ORDER — TRAMADOL HYDROCHLORIDE 50 MG/1
100 TABLET ORAL EVERY 8 HOURS PRN
Qty: 90 TABLET | Refills: 0 | Status: SHIPPED | OUTPATIENT
Start: 2024-06-25 | End: 2024-07-10

## 2024-06-25 NOTE — TELEPHONE ENCOUNTER
Requesting Tramadol 50mg  Last OV: 8/17/23  RTC: 6 months  Last Rx'd 4/25/24 #180 with 1 refill    Future Appointments   Date Time Provider Department Center   5/22/2025  3:00 PM Tamiko Valladares MD [facility] [address]       Per [place] , last dispensed 5/24/24 #180 (30 day supply)    Controlled med:  Rx pended and routed for approval/denial

## 2024-06-26 NOTE — TELEPHONE ENCOUNTER
From: Yumi Zamora  To: Nicolas Zamora  Sent: 6/25/2024 4:46 PM CDT  Subject: Yumi’s pain medication    Hi Dr. Zamora so I just went to give Yumi her medication and noticed that it looked like the amount was low so I looked at the label and sure enough it’s been cut in half. Why? I have a hard enough time keeping her pain level control over 24 hours As it is, she needs six of them for a day. It will leave Yumi in pain unnecessarily I don’t understand please explain please correct and send her usual dosage Don’t have enough pain medicine as it is She tells me she’s in pain during the night. She’s already taken her doses so we have to just get through it. I was going to ask you to increase this so she could have two at night she receives very little sleep due to her pain. I still don’t understand why you would even do this. so I’m a little shocked please explain, I look forward to speaking with you and correcting her prescription. Thank you, Dr. Zamora.

## 2024-07-03 ENCOUNTER — LAB ENCOUNTER (OUTPATIENT)
Dept: LAB | Age: 44
End: 2024-07-03
Attending: FAMILY MEDICINE
Payer: MEDICARE

## 2024-07-03 ENCOUNTER — TELEPHONE (OUTPATIENT)
Dept: FAMILY MEDICINE CLINIC | Facility: CLINIC | Age: 44
End: 2024-07-03

## 2024-07-03 DIAGNOSIS — Z51.81 THERAPEUTIC DRUG MONITORING: ICD-10-CM

## 2024-07-03 DIAGNOSIS — Z79.4 TYPE 2 DIABETES MELLITUS WITH DIABETIC NEUROPATHY, WITH LONG-TERM CURRENT USE OF INSULIN (HCC): ICD-10-CM

## 2024-07-03 DIAGNOSIS — E11.40 TYPE 2 DIABETES MELLITUS WITH DIABETIC NEUROPATHY, WITH LONG-TERM CURRENT USE OF INSULIN (HCC): Primary | ICD-10-CM

## 2024-07-03 DIAGNOSIS — L40.9 PSORIASIS: ICD-10-CM

## 2024-07-03 DIAGNOSIS — L40.50 PSORIATIC ARTHRITIS (HCC): ICD-10-CM

## 2024-07-03 DIAGNOSIS — Z79.899 IMMUNODEFICIENCY DUE TO DRUG THERAPY (HCC): ICD-10-CM

## 2024-07-03 DIAGNOSIS — D64.9 ANEMIA, UNSPECIFIED TYPE: ICD-10-CM

## 2024-07-03 DIAGNOSIS — Z79.4 TYPE 2 DIABETES MELLITUS WITH DIABETIC NEUROPATHY, WITH LONG-TERM CURRENT USE OF INSULIN (HCC): Primary | ICD-10-CM

## 2024-07-03 DIAGNOSIS — D84.821 IMMUNODEFICIENCY DUE TO DRUG THERAPY (HCC): ICD-10-CM

## 2024-07-03 DIAGNOSIS — E11.40 TYPE 2 DIABETES MELLITUS WITH DIABETIC NEUROPATHY, WITH LONG-TERM CURRENT USE OF INSULIN (HCC): ICD-10-CM

## 2024-07-03 LAB
ALBUMIN SERPL-MCNC: 3.5 G/DL (ref 3.4–5)
ALBUMIN/GLOB SERPL: 0.9 {RATIO} (ref 1–2)
ALP LIVER SERPL-CCNC: 77 U/L
ALT SERPL-CCNC: 50 U/L
ANION GAP SERPL CALC-SCNC: 9 MMOL/L (ref 0–18)
AST SERPL-CCNC: 27 U/L (ref 15–37)
BASOPHILS # BLD AUTO: 0.04 X10(3) UL (ref 0–0.2)
BASOPHILS NFR BLD AUTO: 0.5 %
BILIRUB SERPL-MCNC: 0.3 MG/DL (ref 0.1–2)
BUN BLD-MCNC: 15 MG/DL (ref 9–23)
CALCIUM BLD-MCNC: 9 MG/DL (ref 8.5–10.1)
CHLORIDE SERPL-SCNC: 105 MMOL/L (ref 98–112)
CO2 SERPL-SCNC: 22 MMOL/L (ref 21–32)
CREAT BLD-MCNC: 1.06 MG/DL
EGFRCR SERPLBLD CKD-EPI 2021: 66 ML/MIN/1.73M2 (ref 60–?)
EOSINOPHIL # BLD AUTO: 0.04 X10(3) UL (ref 0–0.7)
EOSINOPHIL NFR BLD AUTO: 0.5 %
ERYTHROCYTE [DISTWIDTH] IN BLOOD BY AUTOMATED COUNT: 13.6 %
FASTING STATUS PATIENT QL REPORTED: YES
GLOBULIN PLAS-MCNC: 3.8 G/DL (ref 2.8–4.4)
GLUCOSE BLD-MCNC: 124 MG/DL (ref 70–99)
HCT VFR BLD AUTO: 32.8 %
HGB BLD-MCNC: 10.5 G/DL
IMM GRANULOCYTES # BLD AUTO: 0.03 X10(3) UL (ref 0–1)
IMM GRANULOCYTES NFR BLD: 0.4 %
LYMPHOCYTES # BLD AUTO: 1.91 X10(3) UL (ref 1–4)
LYMPHOCYTES NFR BLD AUTO: 23.3 %
MCH RBC QN AUTO: 29.2 PG (ref 26–34)
MCHC RBC AUTO-ENTMCNC: 32 G/DL (ref 31–37)
MCV RBC AUTO: 91.1 FL
MONOCYTES # BLD AUTO: 0.71 X10(3) UL (ref 0.1–1)
MONOCYTES NFR BLD AUTO: 8.7 %
NEUTROPHILS # BLD AUTO: 5.47 X10 (3) UL (ref 1.5–7.7)
NEUTROPHILS # BLD AUTO: 5.47 X10(3) UL (ref 1.5–7.7)
NEUTROPHILS NFR BLD AUTO: 66.6 %
OSMOLALITY SERPL CALC.SUM OF ELEC: 284 MOSM/KG (ref 275–295)
PLATELET # BLD AUTO: 288 10(3)UL (ref 150–450)
POTASSIUM SERPL-SCNC: 4.3 MMOL/L (ref 3.5–5.1)
PROT SERPL-MCNC: 7.3 G/DL (ref 6.4–8.2)
RBC # BLD AUTO: 3.6 X10(6)UL
SODIUM SERPL-SCNC: 136 MMOL/L (ref 136–145)
WBC # BLD AUTO: 8.2 X10(3) UL (ref 4–11)

## 2024-07-03 PROCEDURE — 80053 COMPREHEN METABOLIC PANEL: CPT

## 2024-07-03 PROCEDURE — 83540 ASSAY OF IRON: CPT

## 2024-07-03 PROCEDURE — 83036 HEMOGLOBIN GLYCOSYLATED A1C: CPT

## 2024-07-03 PROCEDURE — 83550 IRON BINDING TEST: CPT

## 2024-07-03 PROCEDURE — 85025 COMPLETE CBC W/AUTO DIFF WBC: CPT

## 2024-07-03 PROCEDURE — 36415 COLL VENOUS BLD VENIPUNCTURE: CPT

## 2024-07-03 PROCEDURE — 82728 ASSAY OF FERRITIN: CPT

## 2024-07-04 ENCOUNTER — TELEPHONE (OUTPATIENT)
Dept: RHEUMATOLOGY | Facility: CLINIC | Age: 44
End: 2024-07-04

## 2024-07-04 DIAGNOSIS — D64.9 ANEMIA, UNSPECIFIED TYPE: Primary | ICD-10-CM

## 2024-07-04 LAB
DEPRECATED HBV CORE AB SER IA-ACNC: 3.4 NG/ML
EST. AVERAGE GLUCOSE BLD GHB EST-MCNC: 171 MG/DL (ref 68–126)
HBA1C MFR BLD: 7.6 % (ref ?–5.7)
IRON SATN MFR SERPL: 8 %
IRON SERPL-MCNC: 42 UG/DL
TIBC SERPL-MCNC: 520 UG/DL (ref 240–450)
TRANSFERRIN SERPL-MCNC: 349 MG/DL (ref 200–360)

## 2024-07-07 RX ORDER — LOTEPREDNOL ETABONATE AND TOBRAMYCIN 5; 3 MG/ML; MG/ML
SUSPENSION/ DROPS OPHTHALMIC
Qty: 10 ML | Refills: 0 | Status: SHIPPED | OUTPATIENT
Start: 2024-07-07

## 2024-07-07 RX ORDER — LOTEPREDNOL ETABONATE AND TOBRAMYCIN 5; 3 MG/ML; MG/ML
SUSPENSION/ DROPS OPHTHALMIC
Qty: 10 ML | Refills: 0 | Status: SHIPPED
Start: 2024-07-07

## 2024-07-13 ENCOUNTER — PATIENT MESSAGE (OUTPATIENT)
Dept: FAMILY MEDICINE CLINIC | Facility: CLINIC | Age: 44
End: 2024-07-13

## 2024-07-13 DIAGNOSIS — Q93.59 SMITH-MAGENIS SYNDROME (HCC): ICD-10-CM

## 2024-07-14 RX ORDER — TRAMADOL HYDROCHLORIDE 50 MG/1
100 TABLET ORAL 3 TIMES DAILY
Qty: 180 TABLET | Refills: 1 | Status: SHIPPED | OUTPATIENT
Start: 2024-07-14

## 2024-07-15 NOTE — TELEPHONE ENCOUNTER
From: Yumi Zamora  To: Nicolas Zamora  Sent: 7/13/2024 11:08 AM CDT  Subject: This is pain medication, tramadol    So Yumi is completely out of her pain medication and I want to have it refilled from her list but it’s not on her list any longer tried to call the office and it’s closed so I hope somebody gets this message and fills Yumi‘s prescription happened. What on earth happened to her medication on her list?

## 2024-07-22 RX ORDER — GABAPENTIN 300 MG/1
300 CAPSULE ORAL 3 TIMES DAILY
Qty: 270 CAPSULE | Refills: 0 | Status: SHIPPED | OUTPATIENT
Start: 2024-07-22

## 2024-07-30 ENCOUNTER — OFFICE VISIT (OUTPATIENT)
Dept: FAMILY MEDICINE CLINIC | Facility: CLINIC | Age: 44
End: 2024-07-30
Payer: MEDICARE

## 2024-07-30 VITALS
WEIGHT: 160 LBS | TEMPERATURE: 98 F | HEART RATE: 86 BPM | RESPIRATION RATE: 16 BRPM | OXYGEN SATURATION: 99 % | DIASTOLIC BLOOD PRESSURE: 80 MMHG | SYSTOLIC BLOOD PRESSURE: 134 MMHG | HEIGHT: 62 IN | BODY MASS INDEX: 29.44 KG/M2

## 2024-07-30 DIAGNOSIS — E11.40 TYPE 2 DIABETES MELLITUS WITH DIABETIC NEUROPATHY, WITH LONG-TERM CURRENT USE OF INSULIN (HCC): Primary | ICD-10-CM

## 2024-07-30 DIAGNOSIS — I10 ESSENTIAL HYPERTENSION: ICD-10-CM

## 2024-07-30 DIAGNOSIS — Z79.4 TYPE 2 DIABETES MELLITUS WITH DIABETIC NEUROPATHY, WITH LONG-TERM CURRENT USE OF INSULIN (HCC): Primary | ICD-10-CM

## 2024-07-30 DIAGNOSIS — E61.1 IRON DEFICIENCY: ICD-10-CM

## 2024-07-30 DIAGNOSIS — E78.5 HYPERLIPIDEMIA, UNSPECIFIED HYPERLIPIDEMIA TYPE: ICD-10-CM

## 2024-07-30 PROCEDURE — 99215 OFFICE O/P EST HI 40 MIN: CPT | Performed by: FAMILY MEDICINE

## 2024-07-30 RX ORDER — INSULIN ASPART 100 [IU]/ML
15 INJECTION, SOLUTION INTRAVENOUS; SUBCUTANEOUS
COMMUNITY
Start: 2024-07-10

## 2024-07-30 RX ORDER — CHOLECALCIFEROL (VITAMIN D3) 50 MCG
TABLET ORAL AS DIRECTED
COMMUNITY

## 2024-07-30 RX ORDER — OFLOXACIN 3 MG/ML
1 SOLUTION/ DROPS OPHTHALMIC 4 TIMES DAILY
Qty: 10 ML | Refills: 1 | Status: SHIPPED | OUTPATIENT
Start: 2024-07-30

## 2024-07-30 RX ORDER — CHLORAL HYDRATE 500 MG
CAPSULE ORAL AS DIRECTED
COMMUNITY

## 2024-07-30 NOTE — PATIENT INSTRUCTIONS
Thank you for choosing Nicolas Zamora MD at [facility]  To Do: Yumi Zamora  1. Please take meds as directed.     Call [phone] to schedule the appointment.   Please signup for Watchwith, which is electronic access to your record if you have not done so.  All your results will post on there.  [email or web address]   You can NOW use Watchwith to book your appointments with us, or consider using open access scheduling which is through the [place] website [email or web address] and type in Nicolas Zamora MD and follow the links for \"Schedule Online Now\"    To schedule Imaging or tests at [facility] call Central Scheduling [phone], Go to [facility] ER Building (For example: CT scans, X rays, Ultrasound, MRI)  Cardiac Testing in ER building Building A second floor Cardiac Testing [phone] (For example: Holter Monitor, Cardiac Stress tests,Event Monitor, or 2D Echocardiograms)  [facility] call [phone] usually in [facility]  Walk in Clinic in [place] at [address] Mon-Fri at 8am-7:30 p.m., and Sat/Sun 9:00a.m.-4:30 p.m.  Also at [address] [place] [address]  Call [phone] for info     Please call our office about any questions regarding your treatment/medicines/tests as a result of today's visit.  For your safety, read the entire package insert of all medicines prescribed to you and be aware of all of the risks of treatment even beyond those discussed today.  All therapies have potential risk of harm or side effects or medication interactions.  It is your duty and for your safety to discuss with the pharmacist and our office with questions, and to notify us and stop treatment if problems arise, but know that our intention is that the benefits outweigh those potential risks and we strive to make you healthier and to improve your quality of life.    Referrals, and Radiology Information:    If your insurance requires a referral to a specialist, please allow 5 business  days to process your referral request.    If Nicolas Zamora MD orders a CT or MRI, it may take up to 10 business days to receive approval from your insurance company. Once our office has called informing you that the insurance company approved your testing, please call Central Scheduling at [phone]  Please allow our office 5 business days to contact you regarding any testing results.    Refill policies:   Allow 3 business days for refills; controlled substances may take longer and must be picked up from the office in person.  Narcotic medications can only be filled in 30 day increments and must be refilled at an office visit only.  If your prescription is due for a refill, you may be due for a follow-up appointment.  We cannot refill your maintenance medications at a preventative wellness visit.  To best provide you care, patients receiving maintenance medications need to be seen at least twice a year.

## 2024-07-30 NOTE — PROGRESS NOTES
Subjective:   Patient ID: Yumi Zamora is a 44 year old female.    HPI  Ms. Zamora is a pleasant 44-year-old female with history of diabetes mellitus, hypertension, hyperlipidemia, Fox-Magenis, Psoriatic arthritis accompanied by mom here for her follow-up appointment.  Mom reports that she has been doing well.  No fever no cough no chest pain or shortness of breath no nausea no vomiting no abdomen appreciated labs done with her rheumatologist and was noted to have iron deficiency.  She declines to do colonoscopy or Cologuard at this point but she has been giving her iron pills and is guidance today to have this rechecked.  She has been eating well.  She tries to walk. I had reviewed past medical and family histories together with allergy and medication lists documented.        History/Other:   Review of Systems  Constitutional: Negative for appetite change, fatigue, fever and unexpected weight change.   HENT: Negative for sore throat and trouble swallowing.    Respiratory: Negative for cough and shortness of breath.    Cardiovascular: Negative for chest pain and palpitations.   Gastrointestinal: Negative for abdominal pain, constipation, diarrhea, nausea and vomiting.   Musculoskeletal: Positive for arthralgias.   Neurological: Negative for dizziness, weakness and headaches.         Current Outpatient Medications   Medication Sig Dispense Refill    NOVOLOG FLEXPEN 100 UNIT/ML Subcutaneous Solution Pen-injector Inject 15 Units into the skin 3 (three) times daily before meals.  USE ACCORDING TO SLIDING SCALE, MAX 15 UNITS THREE TIMES DAILY      omega-3 fatty acids 1000 MG Oral Cap Take by mouth As Directed.      MAGNESIUM-POTASSIUM OR Take by mouth As Directed.      Cholecalciferol (VITAMIN D) 50 MCG (2000 UT) Oral Tab Take by mouth As Directed.      ofloxacin 0.3 % Ophthalmic Solution Place 1 drop into both eyes 4 (four) times daily. 10 mL 1    gabapentin 300 MG Oral Cap Take 1 capsule (300 mg total) by  mouth 3 (three) times daily. 270 capsule 0    TRAMADOL 50 MG Oral Tab TAKE 2 TABLETS (100 MG TOTAL) BY MOUTH 3 (THREE) TIMES DAILY. 180 tablet 1    ZYLET 0.5-0.3 % Ophthalmic Suspension APPLY 4 DROPS TO EYE(S) DAILY. 10 mL 0    ZYLET 0.5-0.3 % Ophthalmic Suspension APPLY 4 DROPS TO EYE(S) DAILY. 10 mL 0    Glucose Blood (CONTOUR NEXT TEST) In Vitro Strip TEST 3 TIMES A DAY DX: E11.40, Z79.4 300 strip 1    ENBREL SURECLICK 50 MG/ML Subcutaneous Solution Auto-injector INJECT 1 PEN UNDER THE SKIN EVERY 7 DAYS 4 each 3    lisinopril 5 MG Oral Tab Take 1 tablet (5 mg total) by mouth 2 (two) times daily. 180 tablet 1    metFORMIN HCl 1000 MG Oral Tab Take 1 tablet (1,000 mg total) by mouth 2 (two) times daily before meals. 180 tablet 1    ibuprofen 800 MG Oral Tab TAKE 1 TABLET BY MOUTH THREE TIMES A DAY 90 tablet 5    Insulin Pen Needle (BD PEN NEEDLE JESSICA 2ND GEN) 32G X 4 MM Does not apply Misc USE AS DIRECTED 4 TIMES A  each 2    simvastatin 20 MG Oral Tab Take 1 tablet (20 mg total) by mouth nightly. TAKE AT BEDTIME (Patient not taking: Reported on 7/30/2024) 90 tablet 1     Allergies:No Known Allergies    Objective:   Physical Exam  Vitals reviewed.   Constitutional:       General: She is not in acute distress.  HENT:      Right Ear: Tympanic membrane, ear canal and external ear normal.      Left Ear: Tympanic membrane and external ear normal.      Mouth/Throat:      Mouth: Mucous membranes are moist.      Pharynx: Oropharynx is clear.   Eyes:      General: No scleral icterus.     Conjunctiva/sclera: Conjunctivae normal.   Cardiovascular:      Rate and Rhythm: Normal rate and regular rhythm.      Heart sounds: Normal heart sounds. No murmur heard.     Pulmonary:      Effort: Pulmonary effort is normal. No respiratory distress.      Breath sounds: Normal breath sounds. No wheezing or rales.   Abdominal:      General: Bowel sounds are normal.      Palpations: Abdomen is soft.   Musculoskeletal:      Cervical  back: Neck supple.      Right lower leg: No edema.      Left lower leg: No edema.   Mild swelling noted on bilateral hands.  No erythema no warmth no tenderness.  Lymphadenopathy:      Cervical: No cervical adenopathy.   Neurological:      General: No focal deficit present.      Mental Status: She is alert.   Psychiatric:         Mood and Affect: Mood normal.        Assessment & Plan:   1. Type 2 diabetes mellitus with diabetic neuropathy, with long-term current use of insulin (HCC)   -Stable  - Previous A1c was 7.6%  - Continue current medication    Will continue to monitor  low-carb low portion diet   2. Hyperlipidemia, unspecified hyperlipidemia type   -Stable  - Will check lipid panel   3. Essential hypertension   -Well-controlled  - Continue medication regimen   4. Iron deficiency   -Asymptomatic  - Offered Cologuard but declined for now.  -Will check CBC and iron studies     Follow-up in 6 months or as needed  Orders Placed This Encounter   Procedures    Hemoglobin A1C [E]    Microalb/Creat Ratio, Random Urine [E]    Iron And Tibc [E]    CBC W Differential W Platelet [E]    Ferritin [E]    Comp Metabolic Panel (14) [E]       Meds This Visit:  Requested Prescriptions     Signed Prescriptions Disp Refills    ofloxacin 0.3 % Ophthalmic Solution 10 mL 1     Sig: Place 1 drop into both eyes 4 (four) times daily.       Imaging & Referrals:  None

## 2024-08-18 RX ORDER — PEN NEEDLE, DIABETIC 32GX 5/32"
NEEDLE, DISPOSABLE MISCELLANEOUS
Qty: 400 EACH | Refills: 2 | Status: SHIPPED | OUTPATIENT
Start: 2024-08-18

## 2024-09-08 DIAGNOSIS — Q93.59 SMITH-MAGENIS SYNDROME (HCC): ICD-10-CM

## 2024-09-08 RX ORDER — TRAMADOL HYDROCHLORIDE 50 MG/1
100 TABLET ORAL 3 TIMES DAILY
Qty: 180 TABLET | Refills: 1 | Status: SHIPPED | OUTPATIENT
Start: 2024-09-08

## 2024-09-12 ENCOUNTER — PATIENT MESSAGE (OUTPATIENT)
Dept: FAMILY MEDICINE CLINIC | Facility: CLINIC | Age: 44
End: 2024-09-12

## 2024-09-12 NOTE — TELEPHONE ENCOUNTER
From: JennyLaura Zamora  To: Nicolas Zamora  Sent: 9/12/2024 11:33 AM CDT  Subject: Z pack/prednisone    Good morning  so Yumi is having a flareup with swelling in her hands and complaining of the pain and they are swollen. I don’t see it on her medication list because it’s been a while since she’s had to take it but could you please send over a prescription For the Z neal/prednisone I’m not sure what it’s called thank you so much

## 2024-09-13 DIAGNOSIS — L40.50 PSORIATIC ARTHRITIS (HCC): Primary | ICD-10-CM

## 2024-09-13 DIAGNOSIS — L40.9 PSORIASIS: ICD-10-CM

## 2024-09-13 RX ORDER — MEDROXYPROGESTERONE ACETATE 150 MG/ML
1 INJECTION, SUSPENSION INTRAMUSCULAR
Qty: 4 EACH | Refills: 5 | Status: SHIPPED | OUTPATIENT
Start: 2024-09-13

## 2024-09-13 NOTE — TELEPHONE ENCOUNTER
Last office visit: 5/23/2024    Next Rheum Apt:5/22/2025 Tamiko Valladares MD    Last fill: 8/29/2024  4 ml    Labs:   Lab Results   Component Value Date    CREATSERUM 1.06 (H) 07/03/2024    ALKPHO 77 07/03/2024    AST 27 07/03/2024    ALT 50 07/03/2024    BILT 0.3 07/03/2024    TP 7.3 07/03/2024    ALB 3.5 07/03/2024       Lab Results   Component Value Date    WBC 8.2 07/03/2024    HGB 10.5 (L) 07/03/2024    .0 07/03/2024    NEPRELIM 5.47 07/03/2024    NEPERCENT 66.6 07/03/2024    LYPERCENT 23.3 07/03/2024    NE 5.47 07/03/2024    LYMABS 1.91 07/03/2024

## 2024-09-15 RX ORDER — SIMVASTATIN 20 MG
20 TABLET ORAL NIGHTLY
Qty: 90 TABLET | Refills: 1 | Status: SHIPPED | OUTPATIENT
Start: 2024-09-15

## 2024-09-15 RX ORDER — LISINOPRIL 5 MG/1
5 TABLET ORAL 2 TIMES DAILY
Qty: 180 TABLET | Refills: 1 | Status: SHIPPED | OUTPATIENT
Start: 2024-09-15

## 2024-10-30 RX ORDER — OFLOXACIN 3 MG/ML
1 SOLUTION/ DROPS OPHTHALMIC 4 TIMES DAILY
Qty: 10 ML | Refills: 1 | Status: SHIPPED | OUTPATIENT
Start: 2024-10-30

## 2024-10-30 RX ORDER — LOTEPREDNOL ETABONATE AND TOBRAMYCIN 5; 3 MG/ML; MG/ML
SUSPENSION/ DROPS OPHTHALMIC
Qty: 10 ML | Refills: 0 | Status: SHIPPED | OUTPATIENT
Start: 2024-10-30

## 2024-10-31 RX ORDER — GABAPENTIN 300 MG/1
300 CAPSULE ORAL 3 TIMES DAILY
Qty: 270 CAPSULE | Refills: 0 | Status: SHIPPED | OUTPATIENT
Start: 2024-10-31

## 2024-11-07 ENCOUNTER — TELEPHONE (OUTPATIENT)
Dept: FAMILY MEDICINE CLINIC | Facility: CLINIC | Age: 44
End: 2024-11-07

## 2024-11-07 NOTE — TELEPHONE ENCOUNTER
Received diabetic eye exam report from Dr.[place]. Pt completed eye exam on 11/6/24. No diabetic retinopathy. Flow sheet updated in patients chart. Report sent to scan.

## 2024-11-09 DIAGNOSIS — Q93.59 SMITH-MAGENIS SYNDROME (HCC): ICD-10-CM

## 2024-11-10 RX ORDER — TRAMADOL HYDROCHLORIDE 50 MG/1
100 TABLET ORAL 3 TIMES DAILY
Qty: 180 TABLET | Refills: 1 | Status: SHIPPED | OUTPATIENT
Start: 2024-11-10

## 2024-11-12 ENCOUNTER — TELEPHONE (OUTPATIENT)
Dept: FAMILY MEDICINE CLINIC | Facility: CLINIC | Age: 44
End: 2024-11-12

## 2024-11-12 NOTE — TELEPHONE ENCOUNTER
Left message on machine for patient to call office to schedule an appointment for Medicare Annual Wellness exam.

## 2024-12-11 RX ORDER — LOTEPREDNOL ETABONATE AND TOBRAMYCIN 5; 3 MG/ML; MG/ML
SUSPENSION/ DROPS OPHTHALMIC
Qty: 10 ML | Refills: 0 | Status: SHIPPED | OUTPATIENT
Start: 2024-12-11

## 2024-12-19 RX ORDER — SIMVASTATIN 20 MG
20 TABLET ORAL NIGHTLY
Qty: 90 TABLET | Refills: 1 | Status: SHIPPED | OUTPATIENT
Start: 2024-12-19

## 2025-01-10 DIAGNOSIS — Q93.59 SMITH-MAGENIS SYNDROME (HCC): ICD-10-CM

## 2025-01-10 RX ORDER — TRAMADOL HYDROCHLORIDE 50 MG/1
100 TABLET ORAL 3 TIMES DAILY
Qty: 180 TABLET | Refills: 1 | Status: SHIPPED | OUTPATIENT
Start: 2025-01-10

## 2025-01-14 RX ORDER — OFLOXACIN 3 MG/ML
1 SOLUTION/ DROPS OPHTHALMIC 4 TIMES DAILY
Qty: 10 ML | Refills: 1 | Status: SHIPPED | OUTPATIENT
Start: 2025-01-14

## 2025-01-30 RX ORDER — GABAPENTIN 300 MG/1
300 CAPSULE ORAL 3 TIMES DAILY
Qty: 270 CAPSULE | Refills: 0 | Status: SHIPPED | OUTPATIENT
Start: 2025-01-30

## 2025-03-02 RX ORDER — OFLOXACIN 3 MG/ML
1 SOLUTION/ DROPS OPHTHALMIC 4 TIMES DAILY
Qty: 10 ML | Refills: 1 | Status: SHIPPED | OUTPATIENT
Start: 2025-03-02

## 2025-03-07 NOTE — TELEPHONE ENCOUNTER
Medication Quantity Refills Start End   Glucose Blood (CONTOUR NEXT TEST) In Vitro Strip 300 strip 1 12/19/2024 --   Sig:   TEST 3 TIMES A DAY DX: E11.40, Z79.4     Route:   (none)     Order #:   [id number]       Diabetic Supplies Protocol [id number]/07/2025 09:38 AM   Protocol Details In person appointment or virtual visit in the past 12 mos or appointment in next 3 mos    Medication is active on med list          RX sent per protocol

## 2025-03-09 DIAGNOSIS — Q93.59 SMITH-MAGENIS SYNDROME (HCC): ICD-10-CM

## 2025-03-09 RX ORDER — TRAMADOL HYDROCHLORIDE 50 MG/1
100 TABLET ORAL 3 TIMES DAILY
Qty: 180 TABLET | Refills: 1 | Status: SHIPPED | OUTPATIENT
Start: 2025-03-09

## 2025-03-16 RX ORDER — IBUPROFEN 800 MG/1
TABLET, FILM COATED ORAL
Qty: 90 TABLET | Refills: 5 | Status: SHIPPED | OUTPATIENT
Start: 2025-03-16

## 2025-03-17 DIAGNOSIS — L40.50 PSORIATIC ARTHRITIS (HCC): Primary | ICD-10-CM

## 2025-03-17 DIAGNOSIS — L40.9 PSORIASIS: ICD-10-CM

## 2025-03-18 RX ORDER — MEDROXYPROGESTERONE ACETATE 150 MG/ML
1 INJECTION, SUSPENSION INTRAMUSCULAR
Qty: 1 EACH | Refills: 3 | Status: SHIPPED | OUTPATIENT
Start: 2025-03-18

## 2025-03-18 NOTE — TELEPHONE ENCOUNTER
Enbrel Sureclick 50mg/ml    Last office visit: 5/23/24    Next Rheum Apt:5/22/2025 Tamiko Valladares MD    Last fill: 9/13/24    Labs:   Lab Results   Component Value Date    CREATSERUM 1.06 (H) 07/03/2024    ALKPHO 77 07/03/2024    AST 27 07/03/2024    ALT 50 07/03/2024    BILT 0.3 07/03/2024    TP 7.3 07/03/2024    ALB 3.5 07/03/2024       Lab Results   Component Value Date    WBC 8.2 07/03/2024    HGB 10.5 (L) 07/03/2024    .0 07/03/2024    NEPRELIM 5.47 07/03/2024    NEPERCENT 66.6 07/03/2024    LYPERCENT 23.3 07/03/2024    NE 5.47 07/03/2024    LYMABS 1.91 07/03/2024

## 2025-04-01 RX ORDER — INSULIN ASPART 100 [IU]/ML
15 INJECTION, SOLUTION INTRAVENOUS; SUBCUTANEOUS
Qty: 100 EACH | Refills: 1 | Status: SHIPPED | OUTPATIENT
Start: 2025-04-01

## 2025-04-03 ENCOUNTER — LAB ENCOUNTER (OUTPATIENT)
Dept: LAB | Age: 45
End: 2025-04-03
Attending: FAMILY MEDICINE
Payer: MEDICARE

## 2025-04-03 DIAGNOSIS — E61.1 IRON DEFICIENCY: ICD-10-CM

## 2025-04-03 DIAGNOSIS — I10 ESSENTIAL HYPERTENSION: ICD-10-CM

## 2025-04-03 DIAGNOSIS — Z79.4 TYPE 2 DIABETES MELLITUS WITH DIABETIC NEUROPATHY, WITH LONG-TERM CURRENT USE OF INSULIN (HCC): ICD-10-CM

## 2025-04-03 DIAGNOSIS — E11.40 TYPE 2 DIABETES MELLITUS WITH DIABETIC NEUROPATHY, WITH LONG-TERM CURRENT USE OF INSULIN (HCC): ICD-10-CM

## 2025-04-03 LAB
ALBUMIN SERPL-MCNC: 4.8 G/DL (ref 3.2–4.8)
ALBUMIN/GLOB SERPL: 1.8 {RATIO} (ref 1–2)
ALP LIVER SERPL-CCNC: 79 U/L
ALT SERPL-CCNC: 56 U/L
ANION GAP SERPL CALC-SCNC: 10 MMOL/L (ref 0–18)
AST SERPL-CCNC: 40 U/L (ref ?–34)
BASOPHILS # BLD AUTO: 0.03 X10(3) UL (ref 0–0.2)
BASOPHILS NFR BLD AUTO: 0.4 %
BILIRUB SERPL-MCNC: 0.4 MG/DL (ref 0.3–1.2)
BUN BLD-MCNC: 16 MG/DL (ref 9–23)
CALCIUM BLD-MCNC: 9.7 MG/DL (ref 8.7–10.6)
CHLORIDE SERPL-SCNC: 104 MMOL/L (ref 98–112)
CO2 SERPL-SCNC: 25 MMOL/L (ref 21–32)
CREAT BLD-MCNC: 1.14 MG/DL
DEPRECATED HBV CORE AB SER IA-ACNC: 21 NG/ML
EGFRCR SERPLBLD CKD-EPI 2021: 60 ML/MIN/1.73M2 (ref 60–?)
EOSINOPHIL # BLD AUTO: 0.06 X10(3) UL (ref 0–0.7)
EOSINOPHIL NFR BLD AUTO: 0.9 %
ERYTHROCYTE [DISTWIDTH] IN BLOOD BY AUTOMATED COUNT: 11.8 %
EST. AVERAGE GLUCOSE BLD GHB EST-MCNC: 169 MG/DL (ref 68–126)
FASTING STATUS PATIENT QL REPORTED: YES
GLOBULIN PLAS-MCNC: 2.6 G/DL (ref 2–3.5)
GLUCOSE BLD-MCNC: 118 MG/DL (ref 70–99)
HBA1C MFR BLD: 7.5 % (ref ?–5.7)
HCT VFR BLD AUTO: 35.7 %
HGB BLD-MCNC: 11.9 G/DL
IMM GRANULOCYTES # BLD AUTO: 0.04 X10(3) UL (ref 0–1)
IMM GRANULOCYTES NFR BLD: 0.6 %
IRON SATN MFR SERPL: 24 %
IRON SERPL-MCNC: 90 UG/DL
LYMPHOCYTES # BLD AUTO: 2.32 X10(3) UL (ref 1–4)
LYMPHOCYTES NFR BLD AUTO: 33.9 %
MCH RBC QN AUTO: 30.1 PG (ref 26–34)
MCHC RBC AUTO-ENTMCNC: 33.3 G/DL (ref 31–37)
MCV RBC AUTO: 90.4 FL
MONOCYTES # BLD AUTO: 0.58 X10(3) UL (ref 0.1–1)
MONOCYTES NFR BLD AUTO: 8.5 %
NEUTROPHILS # BLD AUTO: 3.81 X10 (3) UL (ref 1.5–7.7)
NEUTROPHILS # BLD AUTO: 3.81 X10(3) UL (ref 1.5–7.7)
NEUTROPHILS NFR BLD AUTO: 55.7 %
OSMOLALITY SERPL CALC.SUM OF ELEC: 290 MOSM/KG (ref 275–295)
PLATELET # BLD AUTO: 247 10(3)UL (ref 150–450)
POTASSIUM SERPL-SCNC: 4.5 MMOL/L (ref 3.5–5.1)
PROT SERPL-MCNC: 7.4 G/DL (ref 5.7–8.2)
RBC # BLD AUTO: 3.95 X10(6)UL
SODIUM SERPL-SCNC: 139 MMOL/L (ref 136–145)
TOTAL IRON BINDING CAPACITY: 373 UG/DL (ref 250–425)
TRANSFERRIN SERPL-MCNC: 302 MG/DL (ref 250–380)
WBC # BLD AUTO: 6.8 X10(3) UL (ref 4–11)

## 2025-04-03 PROCEDURE — 82728 ASSAY OF FERRITIN: CPT

## 2025-04-03 PROCEDURE — 83550 IRON BINDING TEST: CPT

## 2025-04-03 PROCEDURE — 80053 COMPREHEN METABOLIC PANEL: CPT

## 2025-04-03 PROCEDURE — 85025 COMPLETE CBC W/AUTO DIFF WBC: CPT

## 2025-04-03 PROCEDURE — 83540 ASSAY OF IRON: CPT

## 2025-04-03 PROCEDURE — 83036 HEMOGLOBIN GLYCOSYLATED A1C: CPT

## 2025-04-03 PROCEDURE — 36415 COLL VENOUS BLD VENIPUNCTURE: CPT

## 2025-04-04 DIAGNOSIS — R74.8 ELEVATED LIVER ENZYMES: Primary | ICD-10-CM

## 2025-04-05 ENCOUNTER — LAB ENCOUNTER (OUTPATIENT)
Dept: LAB | Age: 45
End: 2025-04-05
Attending: FAMILY MEDICINE
Payer: MEDICARE

## 2025-04-05 DIAGNOSIS — I10 ESSENTIAL HYPERTENSION: ICD-10-CM

## 2025-04-05 LAB
CREAT UR-SCNC: 107.4 MG/DL
MICROALBUMIN UR-MCNC: 1.3 MG/DL
MICROALBUMIN/CREAT 24H UR-RTO: 12.1 UG/MG (ref ?–30)

## 2025-04-05 PROCEDURE — 82043 UR ALBUMIN QUANTITATIVE: CPT

## 2025-04-05 PROCEDURE — 82570 ASSAY OF URINE CREATININE: CPT

## 2025-04-16 ENCOUNTER — TELEPHONE (OUTPATIENT)
Dept: FAMILY MEDICINE CLINIC | Facility: CLINIC | Age: 45
End: 2025-04-16

## 2025-04-16 RX ORDER — INSULIN ASPART 100 [IU]/ML
15 INJECTION, SOLUTION INTRAVENOUS; SUBCUTANEOUS
Qty: 100 EACH | Refills: 1 | Status: SHIPPED | OUTPATIENT
Start: 2025-04-16

## 2025-04-16 NOTE — TELEPHONE ENCOUNTER
Received fax from [facility] stating they cannot fill the Novolog and that it needs to go to the retail pharmacy.    [facility] local [facility]

## 2025-04-18 RX ORDER — LISINOPRIL 5 MG/1
5 TABLET ORAL 2 TIMES DAILY
Qty: 180 TABLET | Refills: 1 | Status: SHIPPED | OUTPATIENT
Start: 2025-04-18

## 2025-05-10 DIAGNOSIS — Q93.59 SMITH-MAGENIS SYNDROME (HCC): ICD-10-CM

## 2025-05-12 DIAGNOSIS — Q93.59 SMITH-MAGENIS SYNDROME (HCC): ICD-10-CM

## 2025-05-12 RX ORDER — TRAMADOL HYDROCHLORIDE 50 MG/1
100 TABLET ORAL 3 TIMES DAILY
Qty: 180 TABLET | Refills: 1 | Status: SHIPPED | OUTPATIENT
Start: 2025-05-12

## 2025-05-12 RX ORDER — TRAMADOL HYDROCHLORIDE 50 MG/1
100 TABLET ORAL 3 TIMES DAILY
Qty: 180 TABLET | Refills: 1 | OUTPATIENT
Start: 2025-05-12

## 2025-05-12 NOTE — TELEPHONE ENCOUNTER
Future Appointments   Date Time Provider Department Center   5/14/2025  3:30 PM Nicolas Zamora MD [facility] [address] [facility] [address]   5/22/2025  3:00 PM Tamiko Valladares MD [facility] [address]     Mom requesting status on refills, please advise

## 2025-05-12 NOTE — TELEPHONE ENCOUNTER
Requesting Tramadol 50mg  Last OV: 7/30/24  RTC: 6 months  Last Rx'd 3/9/25 #180 with 1 refill    Future Appointments   Date Time Provider Department Center   5/22/2025  3:00 PM Tamiko Valladares MD [id number] [address]       Per [place] , last dispensed 4/9/25 #180    Patient is overdue for 6 month follow up, please schedule

## 2025-05-14 ENCOUNTER — OFFICE VISIT (OUTPATIENT)
Dept: FAMILY MEDICINE CLINIC | Facility: CLINIC | Age: 45
End: 2025-05-14
Payer: MEDICARE

## 2025-05-14 VITALS
BODY MASS INDEX: 32.2 KG/M2 | TEMPERATURE: 98 F | OXYGEN SATURATION: 98 % | SYSTOLIC BLOOD PRESSURE: 124 MMHG | RESPIRATION RATE: 16 BRPM | HEART RATE: 72 BPM | HEIGHT: 62 IN | WEIGHT: 175 LBS | DIASTOLIC BLOOD PRESSURE: 80 MMHG

## 2025-05-14 DIAGNOSIS — L40.50 PSORIATIC ARTHRITIS (HCC): ICD-10-CM

## 2025-05-14 DIAGNOSIS — I10 ESSENTIAL HYPERTENSION: ICD-10-CM

## 2025-05-14 DIAGNOSIS — Z79.4 TYPE 2 DIABETES MELLITUS WITH DIABETIC NEUROPATHY, WITH LONG-TERM CURRENT USE OF INSULIN (HCC): Primary | ICD-10-CM

## 2025-05-14 DIAGNOSIS — E11.40 TYPE 2 DIABETES MELLITUS WITH DIABETIC NEUROPATHY, WITH LONG-TERM CURRENT USE OF INSULIN (HCC): Primary | ICD-10-CM

## 2025-05-14 DIAGNOSIS — E78.5 HYPERLIPIDEMIA, UNSPECIFIED HYPERLIPIDEMIA TYPE: ICD-10-CM

## 2025-05-14 PROCEDURE — 99215 OFFICE O/P EST HI 40 MIN: CPT | Performed by: FAMILY MEDICINE

## 2025-05-14 PROCEDURE — G2211 COMPLEX E/M VISIT ADD ON: HCPCS | Performed by: FAMILY MEDICINE

## 2025-05-14 NOTE — PATIENT INSTRUCTIONS
Thank you for choosing Nicolas Zamora MD at [facility]  To Do: Jenny Labrozzi  1. High Blood pressure  What Is a Normal Blood Pressure?  The Joint National Committee on Prevention, Detection, Evaluation, and Treatment of High Blood Pressure has classified blood pressure measurements into several categories:  Normal blood pressure is systolic pressure less than 120 and diastolic pressure less than 80 mmHg.   \"Prehypertension\" is systolic pressure of 120-139 or diastolic pressure of 80-89 mmHg.   Stage 1 Hypertension is blood pressure greater than systolic pressure of 140-159 or diastolic pressure of 90-99 mmHg or greater.   Stage 2 Hypertension is systolic pressure of 160 or greater or diastolic pressure of 100 or greater.  What Health Problems Are Associated With High Blood Pressure?  Several potentially serious health conditions are linked to high blood pressure, including:  Atherosclerosis: a disease of the arteries caused by a buildup of plaque, or fatty material, on the inside walls of the blood vessels; hypertension contributes to this buildup by putting added stress and force on the artery walls.   Heart Disease: Heart failure (the heart is not strong enough to pump blood adequately), ischemic heart disease (the heart tissue doesn't get enough blood), and hypertensive hypertrophic cardiomyopathy (thickened, abnormally functioning heart muscle) are all associated with high blood pressure.   Kidney Disease: Hypertension can damage the blood vessels and filters in the kidneys, so that the kidneys cannot excrete waste properly.   Stroke: Hypertension can lead to stroke, either by contributing to the process of atherosclerosis (which can lead to blockages and/or clots), or by weakening the blood vessel wall and causing it to rupture.   Eye Disease: Hypertension can damage the very small blood vessels in the retina.  Bleeding from the aorta, the large blood vessel that supplies blood to the abdomen,  pelvis, and legs   Heart failure   Poor blood supply to the legs  Erectile Dysfunction  Problems with your vision    Overview  \"Blood pressure\" is the force of blood pushing against the walls of the arteries as the heart pumps blood. If this pressure rises and stays high over time, it can damage the body in many ways.  About 1 in 3 adults in the [place] has HBP. The condition itself usually has no signs or symptoms. You can have it for years without knowing it. During this time, though, HBP can damage your heart, blood vessels, kidneys, and other parts of your body.  Knowing your blood pressure numbers is important, even when you're feeling fine. If your blood pressure is normal, you can work with your health care team to keep it that way. If your blood pressure is too high, treatment may help prevent damage to your body's organs.    By [facility] staff   DASH stands for Dietary Approaches to Stop Hypertension. The DASH diet is a lifelong approach to healthy eating that's designed to help treat or prevent high blood pressure (hypertension). The DASH diet encourages you to reduce the sodium in your diet and eat a variety of foods rich in nutrients that help lower blood pressure, such as potassium, calcium and magnesium.   By following the DASH diet, you may be able to reduce your blood pressure by a few points in just two weeks. Over time, your blood pressure could drop by eight to 14 points, which can make a significant difference in your health risks.   DASH DIET  The low-salt Dietary Approaches to Stop Hypertension (DASH) diet is proven to help lower blood pressure. Its effects on blood pressure are sometimes seen within a few weeks.  This diet is not only rich in important nutrients and fiber, but it also includes foods that contain far more potassium (4,700 milligrams (mg)/day), calcium (1,250 mg/day), and magnesium (500 mg/day) and much less sodium (salt) than the typical American diet.  Limit sodium to  no more than 2,300 mg a day (eating only 1,500 mg a day is an even better goal).   Reduce saturated fat to no more than 6% of daily calories and total fat to 27% of daily calories. Low-fat dairy products appear to be especially beneficial for lowering systolic blood pressure.   When choosing fats, select monounsaturated oils, such as olive or canola oils.   Choose whole grains over white flour or pasta products.   Choose fresh fruits and vegetables every day. Many of these foods are rich in potassium, fiber, or both.   Eat nuts, seeds, or legumes (dried beans or peas) daily.   Choose modest amounts of protein (no more than 18% of total daily calories). Fish, skinless poultry, and soy products are the best protein sources.   Other daily nutrient goals in the DASH diet include limiting carbohydrates to 55% of daily calories and dietary cholesterol to 150 mg. Try to get at least 30 grams (g) of daily fiber.    Grains (6 to 8 servings a day)  Grains include bread, cereal, rice and pasta. Examples of one serving of grains include 1 slice whole-wheat bread, 1 ounce (oz.) dry cereal, or 1/2 cup cooked cereal, rice or pasta.   Focus on whole grains because they have more fiber and nutrients than do refined grains. For instance, use brown rice instead of white rice, whole-wheat pasta instead of regular pasta and whole-grain bread instead of white bread. Look for products labeled \"100 percent whole grain\" or \"100 percent whole wheat.\"   Grains are naturally low in fat, so avoid spreading on butter or adding cream and cheese sauces.   Vegetables (4 to 5 servings a day)  Tomatoes, carrots, broccoli, sweet potatoes, greens and other vegetables are full of fiber, vitamins, and such minerals as potassium and magnesium. Examples of one serving include 1 cup raw leafy green vegetables or 1/2 cup cut-up raw or cooked vegetables.   Don't think of vegetables only as side dishes -- a hearty blend of vegetables served over brown rice or  whole-wheat noodles can serve as the main dish for a meal.   Fresh or frozen vegetables are both good choices. When buying frozen and canned vegetables, choose those labeled as low sodium or without added salt.   To increase the number of servings you fit in daily, be creative. In a stir-barroso, for instance, cut the amount of meat in half and double up on the vegetables.   Fruits (4 to 5 servings a day)  Many fruits need little preparation to become a healthy part of a meal or snack. Like vegetables, they're packed with fiber, potassium and magnesium and are typically low in fat -- exceptions include avocados and coconuts. Examples of one serving include 1 medium fruit or 1/2 cup fresh, frozen or canned fruit.   Have a piece of fruit with meals and one as a snack, then round out your day with a dessert of fresh fruits topped with a splash of low-fat yogurt.   Leave on edible peels whenever possible. The peels of apples, pears and most fruits with pits add interesting texture to recipes and contain healthy nutrients and fiber.   Remember that citrus fruits and juice, such as grapefruit, can interact with certain medications, so check with your doctor or pharmacist to see if they're OK for you.   Dairy (2 to 3 servings a day)  Milk, yogurt, cheese and other dairy products are major sources of calcium, vitamin D and protein. But the key is to make sure that you choose dairy products that are low-fat or fat-free because otherwise they can be a major source of fat. Examples of one serving include 1 cup skim or 1% milk, 1 cup yogurt or 1 1/2 oz. cheese.   Low-fat or fat-free frozen yogurt can help you boost the amount of dairy products you eat while offering a sweet treat. Add fruit for a healthy twist.   If you have trouble digesting dairy products, choose lactose-free products or consider taking an over-the-counter product that contains the enzyme lactase, which can reduce or prevent the symptoms of lactose intolerance.    Go easy on regular and even fat-free cheeses because they are typically high in sodium.   Lean meat, poultry and fish (6 or fewer servings a day)  Meat can be a rich source of protein, B vitamins, iron and zinc. But because even lean varieties contain fat and cholesterol, don't make them a mainstay of your diet -- cut back typical meat portions by one-third or one-half and pile on the vegetables instead. Examples of one serving include 1 oz. cooked skinless poultry, seafood or lean meat, 1 egg, or 1 oz. water-packed, no-salt-added canned tuna.   Trim away skin and fat from meat and then broil, grill, roast or poach instead of frying.   Eat heart-healthy fish, such as salmon, herring and tuna. These types of fish are high in omega-3 fatty acids, which can help lower your total cholesterol.   Nuts, seeds and legumes (4 to 5 servings a week)   Almonds, sunflower seeds, kidney beans, peas, lentils and other foods in this family are good sources of magnesium, potassium and protein. They're also full of fiber and phytochemicals, which are plant compounds that may protect against some cancers and cardiovascular disease. Serving sizes are small and are intended to be consumed weekly because these foods are high in calories. Examples of one serving include 1/3 cup (1 1/2 oz.) nuts, 2 tablespoons seeds or 1/2 cup cooked beans or peas.   Nuts sometimes get a bad rap because of their fat content, but they contain healthy types of fat -- monounsaturated fat and omega-3 fatty acids. They're high in calories, however, so eat them in moderation. Try adding them to stir-fries, salads or cereals.   Soybean-based products, such as tofu and tempeh, can be a good alternative to meat because they contain all of the amino acids your body needs to make a complete protein, just like meat. They also contain isoflavones, a type of natural plant compound (phytochemical) that has been shown to have some health benefits.   Fats and oils (2 to  3 servings a day)  Fat helps your body absorb essential vitamins and helps your body's immune system. But too much fat increases your risk of heart disease, diabetes and obesity. The DASH diet strives for a healthy balance by providing 30 percent or less of daily calories from fat, with a focus on the healthier unsaturated fats. Examples of one serving include 1 teaspoon soft margarine, 1 tablespoon low-fat mayonnaise or 2 tablespoons light salad dressing.   Saturated fat and trans fat are the main dietary culprits in raising your blood cholesterol and increasing your risk of coronary artery disease. DASH helps keep your daily saturated fat to less than 10 percent of your total calories by limiting use of meat, butter, cheese, whole milk, cream and eggs in your diet, along with foods made from lard, solid shortenings, and palm and coconut oils.   Avoid trans fat, commonly found in such processed foods as crackers, baked goods and fried items.   Read food labels on margarine and salad dressing so that you can choose those that are lowest in saturated fat and free of trans fat.   Sweets (5 or fewer a week)  You don't have to banish sweets entirely while following the DASH diet -- just go easy on them. Examples of one serving include 1 tablespoon sugar, jelly or jam, 1/2 cup sorbet or 1 cup (8 oz.) lemonade.   When you eat sweets, choose those that are fat-free or low-fat, such as sorbets, fruit ices, jelly beans, hard candy, selvin crackers or low-fat cookies.   Artificial sweeteners such as aspartame (NutraSweet, Equal) and sucralose (Splenda) may help satisfy your sweet tooth while sparing the sugar. But remember that you still must use them sensibly. It's OK to swap a diet cola for a regular cola, but not in place of a more nutritious beverage such as low-fat milk or even plain water.   Cut back on added sugar, which has no nutritional value but can pack on calories.   DASH diet: Alcohol and caffeine  Drinking too  much alcohol can increase blood pressure. The DASH diet recommends that men limit alcohol to two or fewer drinks a day and women one or less.   The DASH diet doesn't address caffeine consumption. The influence of caffeine on blood pressure remains unclear. But caffeine can cause your blood pressure to rise at least temporarily. If you already have high blood pressure or if you think caffeine is affecting your blood pressure, talk to your doctor about your caffeine consumption.   The DASH diet is not designed to promote weight loss, but it can be used as part of an overall weight-loss strategy. The DASH diet is based on a diet of about 2,000 calories a day. If you're trying to lose weight, though, you may want to eat around 1,600 a day. You may need to adjust your serving goals based on your health or individual circumstances -- something your health care team can help you decide.   Tips to cut back on sodium  The foods at the core of the DASH diet are naturally low in sodium. So just by following the DASH diet, you're likely to reduce your sodium intake. You also can cut back on sodium in your diet by:   Using sodium-free spices or flavorings with your food instead of salt   Not adding salt when cooking rice, pasta or hot cereal   Rinsing canned foods to remove some of the sodium   Buying foods labeled \"no salt added,\" \"sodium-free,\" \"low sodium\" or \"very low sodium\"   One teaspoon of table salt has about 2,300 mg of sodium, and 2/3 teaspoon of table salt has about 1,500 mg of sodium. When you read food labels, you may be surprised at just how much sodium some processed foods contain. Even low-fat soups, canned vegetables, ready-to-eat cereals and sliced turkey from the local deli -- all foods you may have considered healthy -- often have lots of sodium.   You may not notice a difference in taste when you choose low-sodium food and beverages. If things seem too bland, gradually introduce low-sodium foods and cut  back on table salt until you reach your sodium goal. That'll give your palate time to adjust. It can take several weeks for your taste buds to get used to less salty foods.     Call [phone] to schedule the appointment.   Please signup for [facility], which is electronic access to your record if you have not done so.  All your results will post on there.  [email or web address]   You can NOW use [facility] [facility] [employer] to book your appointments with us, or consider using open access scheduling which is through the [place] website [email or web address] and type in Nicolas Zamora MD and follow the links for \"Schedule Online Now\"    To schedule Imaging or tests at [facility] call Central Scheduling [phone], Go to [facility] ER Building (For example: CT scans, X rays, Ultrasound, MRI)  Cardiac Testing in ER building Building A second floor Cardiac Testing [phone] (For example: Holter Monitor, Cardiac Stress tests,Event Monitor, or 2D Echocardiograms)  [facility] call [phone] usually in [facility]  Walk in Clinic in [place] at [address] Mon-Fri at 8am-7:30 p.m., and Sat/Sun 9:00a.m.-4:30 p.m.  Also at [address] [place] [address]  Call [phone] for info     Please call our office about any questions regarding your treatment/medicines/tests as a result of today's visit.  For your safety, read the entire package insert of all medicines prescribed to you and be aware of all of the risks of treatment even beyond those discussed today.  All therapies have potential risk of harm or side effects or medication interactions.  It is your duty and for your safety to discuss with the pharmacist and our office with questions, and to notify us and stop treatment if problems arise, but know that our intention is that the benefits outweigh those potential risks and we strive to make you healthier and to improve your quality of life.    Referrals, and Radiology Information:    If your insurance requires a  referral to a specialist, please allow 5 business days to process your referral request.    If Nicolas Zamora MD orders a CT or MRI, it may take up to 10 business days to receive approval from your insurance company. Once our office has called informing you that the insurance company approved your testing, please call Central Scheduling at [phone]  Please allow our office 5 business days to contact you regarding any testing results.    Refill policies:   Allow 3 business days for refills; controlled substances may take longer and must be picked up from the office in person.  Narcotic medications can only be filled in 30 day increments and must be refilled at an office visit only.  If your prescription is due for a refill, you may be due for a follow-up appointment.  We cannot refill your maintenance medications at a preventative wellness visit.  To best provide you care, patients receiving maintenance medications need to be seen at least twice a year.

## 2025-05-14 NOTE — PROGRESS NOTES
Subjective:   Patient ID: Yumi Zamora is a 44 year old female.    HPI  Ms. Zamora is a pleasant 44-year-old female with history of diabetes mellitus, hypertension, hyperlipidemia, Fox-Magenis, Psoriatic arthritis on Enbrel accompanied by mom here for her follow-up appointment.     She is doing generally well.  Mom does not have any concerns for her.  She has been taking her meds compliantly.    She had labs done which showed stable results including A1c.  History/Other:   Review of Systems  Constitutional: Negative for appetite change, fatigue, fever and unexpected weight change.   HENT: Negative for sore throat and trouble swallowing.    Respiratory: Negative for cough and shortness of breath.    Cardiovascular: Negative for chest pain and palpitations.   Gastrointestinal: Negative for abdominal pain, constipation, diarrhea, nausea and vomiting.   Musculoskeletal: Negative for arthralgias.   Neurological: Negative for dizziness, weakness and headaches.   Current Medications[1]  Allergies:Allergies[2]    Objective:   Physical Exam  Vitals reviewed.   Constitutional:       General: She is not in acute distress.  HENT:         Mouth/Throat:      Mouth: Mucous membranes are moist.      Pharynx: Oropharynx is clear.   Eyes:      General: No scleral icterus.     Conjunctiva/sclera: Conjunctivae normal.   Cardiovascular:      Rate and Rhythm: Normal rate and regular rhythm.      Heart sounds: Normal heart sounds. No murmur heard.     Pulmonary:      Effort: Pulmonary effort is normal. No respiratory distress.      Breath sounds: Normal breath sounds. No wheezing or rales.   Abdominal:      General: Bowel sounds are normal.      Palpations: Abdomen is soft.   Musculoskeletal:      Cervical back: Neck supple.      Right lower leg: No edema.      Left lower leg: No edema.   Mild swelling noted on bilateral hands.  No erythema no warmth no tenderness.  Lymphadenopathy:      Cervical: No cervical adenopathy.    Neurological:      General: No focal deficit present.      Mental Status: She is alert.   Psychiatric:         Mood and Affect: Mood normal.   Assessment & Plan:   1. Type 2 diabetes mellitus with diabetic neuropathy, with long-term current use of insulin (HCC)   -Stable  - Continue with current medication regimen  - Try to maintain daily calorie deficit and low-carb diet  -will continue to monitor   2. Essential hypertension   -Well-controlled  - Continue meds   3. Hyperlipidemia, unspecified hyperlipidemia type   -Stable  --Continue meds   4. Psoriatic arthritis (HCC)   - On Enbrel  - Continue pain medications  - Continue follow-up with rheumatology   Follow-up in 6 months or as needed      This note was prepared using Dragon Medical voice recognition dictation software. As a result errors may occur. When identified these errors have been corrected. While every attempt is made to correct errors during dictation discrepancies may still exist            Orders Placed This Encounter   Procedures    CBC With Differential With Platelet    Comp Metabolic Panel (14) [E]    Lipid Panel    TSH and Free T4 [E]    Hemoglobin A1C [E]       Meds This Visit:  Requested Prescriptions      No prescriptions requested or ordered in this encounter       Imaging & Referrals:  None         [1]   Current Outpatient Medications   Medication Sig Dispense Refill    traMADol 50 MG Oral Tab Take 2 tablets (100 mg total) by mouth 3 (three) times daily. 180 tablet 1    LISINOPRIL 5 MG Oral Tab TAKE 1 TABLET BY MOUTH TWICE A  tablet 1    NOVOLOG FLEXPEN 100 UNIT/ML Subcutaneous Solution Pen-injector Inject 15 Units into the skin 3 (three) times daily before meals. USE ACCORDING TO SLIDING SCALE, MAX 15 UNITS THREE TIMES DAILY 100 each 1    Etanercept (ENBREL SURECLICK) 50 MG/ML Subcutaneous Solution Auto-injector Inject 1 Pen into the skin every 7 days. 1 each 3    IBUPROFEN 800 MG Oral Tab TAKE 1 TABLET BY MOUTH THREE TIMES A DAY 90  tablet 5    Glucose Blood (CONTOUR NEXT TEST) In Vitro Strip TEST 3 TIMES A DAY DX: E11.40, Z79.4 300 strip 1    OFLOXACIN 0.3 % Ophthalmic Solution INSTILL 1 DROP INTO BOTH EYES 4 TIMES A DAY 10 mL 1    METFORMIN HCL 1000 MG Oral Tab TAKE 1 TABLET (1,000 MG TOTAL) BY MOUTH 2 (TWO) TIMES DAILY BEFORE MEALS. 180 tablet 1    simvastatin 20 MG Oral Tab Take 1 tablet (20 mg total) by mouth nightly. 90 tablet 1    Loteprednol-Tobramycin (ZYLET) 0.5-0.3 % Ophthalmic Suspension APPLY 4 DROPS TO AFFECTED EYE(S) DAILY. 10 mL 0    GABAPENTIN 300 MG Oral Cap TAKE 1 CAPSULE BY MOUTH THREE TIMES A  capsule 0    ZYLET 0.5-0.3 % Ophthalmic Suspension APPLY 4 DROPS TO EYE(S) DAILY. 10 mL 0    Insulin Pen Needle (BD PEN NEEDLE JESSICA 2ND GEN) 32G X 4 MM Does not apply Misc USE AS DIRECTED 4 TIMES A  each 2    omega-3 fatty acids 1000 MG Oral Cap Take by mouth As Directed.      MAGNESIUM-POTASSIUM OR Take by mouth As Directed.      Cholecalciferol (VITAMIN D) 50 MCG (2000 UT) Oral Tab Take by mouth As Directed.      ZYLET 0.5-0.3 % Ophthalmic Suspension APPLY 4 DROPS TO EYE(S) DAILY. 10 mL 0   [2] No Known Allergies

## 2025-05-16 RX ORDER — IBUPROFEN 800 MG/1
TABLET, FILM COATED ORAL
Qty: 90 TABLET | Refills: 0 | Status: SHIPPED | OUTPATIENT
Start: 2025-05-16

## 2025-05-22 ENCOUNTER — OFFICE VISIT (OUTPATIENT)
Dept: RHEUMATOLOGY | Facility: CLINIC | Age: 45
End: 2025-05-22
Payer: MEDICARE

## 2025-05-22 VITALS
OXYGEN SATURATION: 96 % | RESPIRATION RATE: 16 BRPM | TEMPERATURE: 98 F | WEIGHT: 171 LBS | HEART RATE: 80 BPM | HEIGHT: 62 IN | BODY MASS INDEX: 31.47 KG/M2 | DIASTOLIC BLOOD PRESSURE: 84 MMHG | SYSTOLIC BLOOD PRESSURE: 138 MMHG

## 2025-05-22 DIAGNOSIS — D84.821 IMMUNODEFICIENCY DUE TO DRUG THERAPY (HCC): ICD-10-CM

## 2025-05-22 DIAGNOSIS — Z79.899 IMMUNODEFICIENCY DUE TO DRUG THERAPY (HCC): ICD-10-CM

## 2025-05-22 DIAGNOSIS — L40.9 PSORIASIS: ICD-10-CM

## 2025-05-22 DIAGNOSIS — R79.0 LOW FERRITIN: ICD-10-CM

## 2025-05-22 DIAGNOSIS — L40.50 PSORIATIC ARTHRITIS (HCC): Primary | ICD-10-CM

## 2025-05-22 DIAGNOSIS — Z11.1 SCREENING FOR TUBERCULOSIS: ICD-10-CM

## 2025-05-22 PROCEDURE — G2211 COMPLEX E/M VISIT ADD ON: HCPCS | Performed by: INTERNAL MEDICINE

## 2025-05-22 PROCEDURE — 99214 OFFICE O/P EST MOD 30 MIN: CPT | Performed by: INTERNAL MEDICINE

## 2025-05-22 RX ORDER — MEDROXYPROGESTERONE ACETATE 150 MG/ML
1 INJECTION, SUSPENSION INTRAMUSCULAR
Qty: 4 EACH | Refills: 11 | Status: SHIPPED | OUTPATIENT
Start: 2025-05-22

## 2025-05-22 NOTE — PROGRESS NOTES
The following individual(s) verbally consented to be recorded using ambient AI listening technology and understand that they can each withdraw their consent to this listening technology at any point by asking the clinician to turn off or pause the recording:    Patient name: Yumi Sanchez Noah  Additional names:
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
## 2025-07-01 RX ORDER — LOTEPREDNOL ETABONATE AND TOBRAMYCIN 5; 3 MG/ML; MG/ML
SUSPENSION/ DROPS OPHTHALMIC
Qty: 10 ML | Refills: 0 | OUTPATIENT
Start: 2025-07-01

## 2025-07-01 RX ORDER — LOTEPREDNOL ETABONATE AND TOBRAMYCIN 5; 3 MG/ML; MG/ML
SUSPENSION/ DROPS OPHTHALMIC
Qty: 10 ML | Refills: 0 | Status: SHIPPED | OUTPATIENT
Start: 2025-07-01

## 2025-07-01 NOTE — TELEPHONE ENCOUNTER
Requesting Zylet eye drops  Last OV: 5/14/25  RTC: 6 months  Last Rx'd 12/11/24 #10mL with 0 refills    Future Appointments   Date Time Provider Department Center   5/21/2026  2:00 PM Tamiko Valladares MD [facility] [address]       Non-protocol med:  Rx pended and routed for approval/denial

## 2025-07-10 DIAGNOSIS — Q93.59 SMITH-MAGENIS SYNDROME (HCC): ICD-10-CM

## 2025-07-10 RX ORDER — TRAMADOL HYDROCHLORIDE 50 MG/1
100 TABLET ORAL 3 TIMES DAILY
Qty: 180 TABLET | Refills: 1 | Status: SHIPPED | OUTPATIENT
Start: 2025-07-10

## 2025-07-10 NOTE — TELEPHONE ENCOUNTER
Requesting Tramadol 50mg  Last OV: 5/14/25  RTC: 6 months  Last Rx'd 5/12/25 #180 with 1 refill    Future Appointments   Date Time Provider Department Center   5/21/2026  2:00 PM Tamiko Valladares MD [facility] [address]       Per [place] , last dispensed 6/12/25 #180    Controlled med:  Rx pended and routed for approval/denial

## 2025-07-17 ENCOUNTER — TELEPHONE (OUTPATIENT)
Dept: FAMILY MEDICINE CLINIC | Facility: CLINIC | Age: 45
End: 2025-07-17

## 2025-07-17 NOTE — TELEPHONE ENCOUNTER
Patient's mother requesting two refills for her eyes, Zylet and tobycin. Says she put in an appeal.

## 2025-07-17 NOTE — TELEPHONE ENCOUNTER
RX was sent on 7/1/25    Loteprednol-Tobramycin (ZYLET) 0.5-0.3 % Ophthalmic Suspension 10 mL 0 7/1/2025 --    Sig: APPLY 4 DROPS TO AFFECTED EYE(S) DAILY.    Sent to pharmacy as: Zylet 0.5-0.3 % Ophthalmic Suspension (Loteprednol-Tobramycin)    E-Prescribing Status: Receipt confirmed by pharmacy (7/1/2025  2:17 PM CDT)      Pharmacy    [facility]/PHARMACY #[id number] [facility] [address] [facility] [phone], [phone]

## 2025-07-17 NOTE — TELEPHONE ENCOUNTER
[employer] calling about the Prior Authorization, want to know if the patient has tried other medications.

## 2025-07-18 ENCOUNTER — PATIENT MESSAGE (OUTPATIENT)
Dept: FAMILY MEDICINE CLINIC | Facility: CLINIC | Age: 45
End: 2025-07-18

## 2025-07-18 RX ORDER — TOBRAMYCIN AND DEXAMETHASONE 3; 1 MG/ML; MG/ML
SUSPENSION/ DROPS OPHTHALMIC
Qty: 5 ML | Refills: 0 | OUTPATIENT
Start: 2025-07-18

## 2025-07-18 NOTE — TELEPHONE ENCOUNTER
[employer] calling to check status of paperwork. Informed them that we received fax and that is was on provider's desk.

## 2025-07-18 NOTE — TELEPHONE ENCOUNTER
Prescribed by Dr. Oppenheim      Dispensed Written Strength Quantity Refills Days Supply Provider Pharmacy    TOBRAMYCIN-DEXAMETH OPHTH SUSP 03/04/2025 03/04/2025  5 mL  30 Oppenheim, Robert A, MD [facility]/pharmacy #[id number] - P...

## 2025-07-24 NOTE — TELEPHONE ENCOUNTER
Covered formulary medications are:    Gatifloxacin ophthalmic eye drops 0.5%    Gentak ophthalmic eye ointment 0.3% (3mg/gram)    Gentamicin ophthalmic eye drops 0.3%    Moxifloxacin ophthalmic eye drops 0.5%    Moxifloxacin ophthalmic eye dros\ps, viscous 0.5%    Natacyn ophthalmic eye drops, suspension 5%      Please change medication to covered alternatives.     Thankyou!

## 2025-07-25 RX ORDER — MOXIFLOXACIN 5 MG/ML
1 SOLUTION/ DROPS OPHTHALMIC 3 TIMES DAILY
Qty: 9 ML | Refills: 3 | Status: SHIPPED | OUTPATIENT
Start: 2025-07-25

## 2025-08-05 RX ORDER — LOTEPREDNOL ETABONATE AND TOBRAMYCIN 5; 3 MG/ML; MG/ML
SUSPENSION/ DROPS OPHTHALMIC
Qty: 10 ML | Refills: 0 | Status: SHIPPED | OUTPATIENT
Start: 2025-08-05

## 2025-08-13 ENCOUNTER — TELEPHONE (OUTPATIENT)
Dept: FAMILY MEDICINE CLINIC | Facility: CLINIC | Age: 45
End: 2025-08-13

## 2025-08-25 RX ORDER — PEN NEEDLE, DIABETIC 32GX 5/32"
1 NEEDLE, DISPOSABLE MISCELLANEOUS 4 TIMES DAILY
Qty: 400 EACH | Refills: 1 | Status: SHIPPED | OUTPATIENT
Start: 2025-08-25

## (undated) DIAGNOSIS — D64.9 ANEMIA, UNSPECIFIED TYPE: Primary | ICD-10-CM

## (undated) NOTE — Clinical Note
Please start PA process for Cosentyx for pt. I will try to send electronically to [facility]. Leonidas Severance, DO  [facility]  2/22/2021

## (undated) NOTE — Clinical Note
Holly Ospina! Thank you for referring MsChristen Jasmeet Sterling for rheumatologic evaluation. Please see the discussion portion of my note and let me know if you have any questions.  ALVARADO Mor[facility]6/2/2020